# Patient Record
Sex: FEMALE | URBAN - METROPOLITAN AREA
[De-identification: names, ages, dates, MRNs, and addresses within clinical notes are randomized per-mention and may not be internally consistent; named-entity substitution may affect disease eponyms.]

---

## 2019-07-05 ENCOUNTER — INPATIENT (INPATIENT)
Facility: HOSPITAL | Age: 74
LOS: 3 days | Discharge: NURSING, REHAB OR LONG-TERM CARE | End: 2019-07-09
Attending: INTERNAL MEDICINE | Admitting: INTERNAL MEDICINE
Payer: MEDICARE

## 2019-07-05 VITALS
DIASTOLIC BLOOD PRESSURE: 48 MMHG | TEMPERATURE: 98 F | OXYGEN SATURATION: 100 % | SYSTOLIC BLOOD PRESSURE: 120 MMHG | HEART RATE: 90 BPM | RESPIRATION RATE: 16 BRPM

## 2019-07-05 DIAGNOSIS — L03.90 CELLULITIS, UNSPECIFIED: ICD-10-CM

## 2019-07-05 LAB
ALBUMIN SERPL ELPH-MCNC: 3.1 G/DL — LOW (ref 3.3–5)
ALP SERPL-CCNC: 81 U/L — SIGNIFICANT CHANGE UP (ref 40–120)
ALT FLD-CCNC: 18 U/L — SIGNIFICANT CHANGE UP (ref 4–33)
ANION GAP SERPL CALC-SCNC: 11 MMO/L — SIGNIFICANT CHANGE UP (ref 7–14)
APTT BLD: 42.4 SEC — HIGH (ref 27.5–36.3)
AST SERPL-CCNC: 20 U/L — SIGNIFICANT CHANGE UP (ref 4–32)
BASOPHILS # BLD AUTO: 0.04 K/UL — SIGNIFICANT CHANGE UP (ref 0–0.2)
BASOPHILS NFR BLD AUTO: 0.4 % — SIGNIFICANT CHANGE UP (ref 0–2)
BILIRUB SERPL-MCNC: 2 MG/DL — HIGH (ref 0.2–1.2)
BUN SERPL-MCNC: 12 MG/DL — SIGNIFICANT CHANGE UP (ref 7–23)
CALCIUM SERPL-MCNC: 8.7 MG/DL — SIGNIFICANT CHANGE UP (ref 8.4–10.5)
CHLORIDE SERPL-SCNC: 100 MMOL/L — SIGNIFICANT CHANGE UP (ref 98–107)
CO2 SERPL-SCNC: 29 MMOL/L — SIGNIFICANT CHANGE UP (ref 22–31)
CREAT SERPL-MCNC: 0.54 MG/DL — SIGNIFICANT CHANGE UP (ref 0.5–1.3)
EOSINOPHIL # BLD AUTO: 0.12 K/UL — SIGNIFICANT CHANGE UP (ref 0–0.5)
EOSINOPHIL NFR BLD AUTO: 1.2 % — SIGNIFICANT CHANGE UP (ref 0–6)
GLUCOSE SERPL-MCNC: 94 MG/DL — SIGNIFICANT CHANGE UP (ref 70–99)
HCT VFR BLD CALC: 37.6 % — SIGNIFICANT CHANGE UP (ref 34.5–45)
HGB BLD-MCNC: 11.8 G/DL — SIGNIFICANT CHANGE UP (ref 11.5–15.5)
IMM GRANULOCYTES NFR BLD AUTO: 0.3 % — SIGNIFICANT CHANGE UP (ref 0–1.5)
INR BLD: 3.01 — HIGH (ref 0.88–1.17)
LYMPHOCYTES # BLD AUTO: 1.78 K/UL — SIGNIFICANT CHANGE UP (ref 1–3.3)
LYMPHOCYTES # BLD AUTO: 18.1 % — SIGNIFICANT CHANGE UP (ref 13–44)
MAGNESIUM SERPL-MCNC: 1.7 MG/DL — SIGNIFICANT CHANGE UP (ref 1.6–2.6)
MCHC RBC-ENTMCNC: 26.5 PG — LOW (ref 27–34)
MCHC RBC-ENTMCNC: 31.4 % — LOW (ref 32–36)
MCV RBC AUTO: 84.5 FL — SIGNIFICANT CHANGE UP (ref 80–100)
MONOCYTES # BLD AUTO: 1.08 K/UL — HIGH (ref 0–0.9)
MONOCYTES NFR BLD AUTO: 11 % — SIGNIFICANT CHANGE UP (ref 2–14)
NEUTROPHILS # BLD AUTO: 6.76 K/UL — SIGNIFICANT CHANGE UP (ref 1.8–7.4)
NEUTROPHILS NFR BLD AUTO: 69 % — SIGNIFICANT CHANGE UP (ref 43–77)
NRBC # FLD: 0 K/UL — SIGNIFICANT CHANGE UP (ref 0–0)
PHOSPHATE SERPL-MCNC: 2.9 MG/DL — SIGNIFICANT CHANGE UP (ref 2.5–4.5)
PLATELET # BLD AUTO: 158 K/UL — SIGNIFICANT CHANGE UP (ref 150–400)
PMV BLD: 11.8 FL — SIGNIFICANT CHANGE UP (ref 7–13)
POTASSIUM SERPL-MCNC: 3.5 MMOL/L — SIGNIFICANT CHANGE UP (ref 3.5–5.3)
POTASSIUM SERPL-SCNC: 3.5 MMOL/L — SIGNIFICANT CHANGE UP (ref 3.5–5.3)
PROT SERPL-MCNC: 7 G/DL — SIGNIFICANT CHANGE UP (ref 6–8.3)
PROTHROM AB SERPL-ACNC: 34.6 SEC — HIGH (ref 9.8–13.1)
RBC # BLD: 4.45 M/UL — SIGNIFICANT CHANGE UP (ref 3.8–5.2)
RBC # FLD: 14.6 % — HIGH (ref 10.3–14.5)
SODIUM SERPL-SCNC: 140 MMOL/L — SIGNIFICANT CHANGE UP (ref 135–145)
WBC # BLD: 9.81 K/UL — SIGNIFICANT CHANGE UP (ref 3.8–10.5)
WBC # FLD AUTO: 9.81 K/UL — SIGNIFICANT CHANGE UP (ref 3.8–10.5)

## 2019-07-05 PROCEDURE — 93971 EXTREMITY STUDY: CPT | Mod: 26,LT

## 2019-07-05 PROCEDURE — 99223 1ST HOSP IP/OBS HIGH 75: CPT | Mod: AI

## 2019-07-05 RX ORDER — ACETAMINOPHEN 500 MG
975 TABLET ORAL ONCE
Refills: 0 | Status: COMPLETED | OUTPATIENT
Start: 2019-07-05 | End: 2019-07-05

## 2019-07-05 RX ORDER — SODIUM CHLORIDE 9 MG/ML
1000 INJECTION INTRAMUSCULAR; INTRAVENOUS; SUBCUTANEOUS ONCE
Refills: 0 | Status: COMPLETED | OUTPATIENT
Start: 2019-07-05 | End: 2019-07-05

## 2019-07-05 RX ORDER — CEFAZOLIN SODIUM 1 G
1000 VIAL (EA) INJECTION ONCE
Refills: 0 | Status: COMPLETED | OUTPATIENT
Start: 2019-07-05 | End: 2019-07-05

## 2019-07-05 RX ADMIN — Medication 975 MILLIGRAM(S): at 23:33

## 2019-07-05 RX ADMIN — SODIUM CHLORIDE 1000 MILLILITER(S): 9 INJECTION INTRAMUSCULAR; INTRAVENOUS; SUBCUTANEOUS at 23:34

## 2019-07-05 RX ADMIN — Medication 100 MILLIGRAM(S): at 23:34

## 2019-07-05 NOTE — ED PROVIDER NOTE - NS ED MD DISPO SPECIAL CONSIDERATION1
PO- 2 weeks d-[facility]H with diffiucult vaginal extraction. Pt. Notes intermittent pain of 5:10. She is tolerating a full diet, ambulatory and had a hard BM today. She has decreased appetite and denies any N/V    Visit Vitals  BP (P) 120/82   Pulse (P) 83     Gen: A&Ox3, NAD  Abdomen: incisions C/D/I, soft, NT  SVE: deferred    U/a: S.015; trace protein: +2 blood    A/P  Doing well post-operative. Post op instructions reviewed. RTO 4 weeks. None

## 2019-07-05 NOTE — ED PROVIDER NOTE - PHYSICAL EXAMINATION
*Gen: NAD, AAO*3  *HEENT: NC/AT, MMM, airway patent, trachea midline  *CV: RRR, S1/S2 present, no murmurs/rubs/gallops  *Resp: no respiratory distress, LCTAB, no wheezing/rales/rhonchi  *Abd: non-distended, soft N/Tx4, no guarding or rigidity  *Neuro: no focal neuro deficits, moving all limbs appropriately  *Extremities: no gross deformity; LLE/foot swelling 2+ with overlying redness over the dorsal aspect with TTP  *Skin: LLE/foot swelling 2+ with overlying redness over the dorsal aspect with TTP, no wounds   ~ Tawanna Rubi M.D.

## 2019-07-05 NOTE — ED PROVIDER NOTE - CHIEF COMPLAINT
The patient is a 74y Female complaining of The patient is a 74y Female complaining of LLE/foot swelling.

## 2019-07-05 NOTE — ED ADULT NURSE NOTE - INTERVENTIONS DEFINITIONS
Stretcher in lowest position, wheels locked, appropriate side rails in place/Instruct patient to call for assistance/Non-slip footwear when patient is off stretcher/Physically safe environment: no spills, clutter or unnecessary equipment/Provide visual clues: red socks

## 2019-07-05 NOTE — ED PROVIDER NOTE - CLINICAL SUMMARY MEDICAL DECISION MAKING FREE TEXT BOX
Buddy: L foot dorsal swelling, redness, pain. Concern for cellulitis. Eval for DVT by US. Abx. Admit. Buddy: L foot dorsal swelling, redness, pain. Concern for cellulitis. Eval for DVT by US. Abx. Admit.    Elicia MALIK: 74 year-old female with LLE/foot swelling x 2 days with overlying rash on dorsal aspect ~ likely cellulitis.  Plan to rule-out DVT with US; unlikely given she is on AC.

## 2019-07-05 NOTE — ED ADULT TRIAGE NOTE - CHIEF COMPLAINT QUOTE
Pt c/o difficulty ambulating , L foot swelling  and  ecchymosis to R upper arm.  Pt with chronic wound to R hip for 6 yrs.  Pt is on coumadin.  Denies chest pain, sob, dizziness

## 2019-07-05 NOTE — ED PROVIDER NOTE - ATTENDING CONTRIBUTION TO CARE
I performed a face-to-face evaluation of the patient and performed a history and physical examination. I agree with the history and physical examination.    Buddy: L foot dorsal swelling, redness, pain. Concern for cellulitis. Eval for DVT by US. Abx. Admit.

## 2019-07-05 NOTE — ED ADULT NURSE NOTE - OBJECTIVE STATEMENT
rec'd pt in room 4... 20 gauge inserted right ac. bloods sent. sent to [facility] and returned. given meds and ivf as ordered. pt states she was in nursing home for diff walking and lower ext swelling... sent home approx 1 month ago but walking has become more difficult and swelling increased. [facility]  pt came to [facility] hoping to be admitted to rehab.  states  recently had a pacemaker placed. pt has b/l pedal edema but left foot significantly more swollen and red.  right lower ext has multiple scratches and scabs. has multiple bruises throughout the whole body at different stages of healing.    states was on coumadin but told 2 days ago to stop for 2 days. has very dark large bruise to back of left arm and multiple smaller bruises throughout left arm.. minimum 7 bruises.  , back has at least 9 small bruises on left side and 3 on right side.  right arm has 3 larger darker brusies and at least 4 smaller ones on lower part of arm.  right breast has 3 dark bruises on side.  left calf has 2 small bruises. left thigh ha 1 small bruise. has deep non healing ulse to right hip... states has had for 6 years.  denies medical issues but has home o2.  pt aa&ox4 and gave hsitory but does not know her medical issues.  no sp but has slight sob.  placed on o2

## 2019-07-05 NOTE — ED PROVIDER NOTE - OBJECTIVE STATEMENT
74 year-old female with history of HTN, HLD, on Coumadin (patient does not know reason) presents to the Emergency Department for LLE/foot swelling x 2 days with overlying rash on dorsal aspect.  + swelling; difficulty with ambulation.  No fevers, chills, nausea, vomiting, chest pain, shortness of breath, abdominal pain.  Patient took Tylenol at home with relief.  No prior procedures/trauma on lower extremity.

## 2019-07-06 DIAGNOSIS — I82.403 ACUTE EMBOLISM AND THROMBOSIS OF UNSPECIFIED DEEP VEINS OF LOWER EXTREMITY, BILATERAL: ICD-10-CM

## 2019-07-06 DIAGNOSIS — J44.9 CHRONIC OBSTRUCTIVE PULMONARY DISEASE, UNSPECIFIED: ICD-10-CM

## 2019-07-06 DIAGNOSIS — R60.0 LOCALIZED EDEMA: ICD-10-CM

## 2019-07-06 DIAGNOSIS — Z79.899 OTHER LONG TERM (CURRENT) DRUG THERAPY: ICD-10-CM

## 2019-07-06 DIAGNOSIS — Z29.9 ENCOUNTER FOR PROPHYLACTIC MEASURES, UNSPECIFIED: ICD-10-CM

## 2019-07-06 DIAGNOSIS — Z90.49 ACQUIRED ABSENCE OF OTHER SPECIFIED PARTS OF DIGESTIVE TRACT: Chronic | ICD-10-CM

## 2019-07-06 DIAGNOSIS — I10 ESSENTIAL (PRIMARY) HYPERTENSION: ICD-10-CM

## 2019-07-06 DIAGNOSIS — M79.604 PAIN IN RIGHT LEG: ICD-10-CM

## 2019-07-06 DIAGNOSIS — L03.116 CELLULITIS OF LEFT LOWER LIMB: ICD-10-CM

## 2019-07-06 LAB
ALBUMIN SERPL ELPH-MCNC: 2.9 G/DL — LOW (ref 3.3–5)
ALP SERPL-CCNC: 75 U/L — SIGNIFICANT CHANGE UP (ref 40–120)
ALT FLD-CCNC: 16 U/L — SIGNIFICANT CHANGE UP (ref 4–33)
ANION GAP SERPL CALC-SCNC: 13 MMO/L — SIGNIFICANT CHANGE UP (ref 7–14)
AST SERPL-CCNC: 18 U/L — SIGNIFICANT CHANGE UP (ref 4–32)
BASE EXCESS BLDV CALC-SCNC: 4.1 MMOL/L — SIGNIFICANT CHANGE UP
BASOPHILS # BLD AUTO: 0.03 K/UL — SIGNIFICANT CHANGE UP (ref 0–0.2)
BASOPHILS NFR BLD AUTO: 0.3 % — SIGNIFICANT CHANGE UP (ref 0–2)
BILIRUB SERPL-MCNC: 1.6 MG/DL — HIGH (ref 0.2–1.2)
BLOOD GAS VENOUS - CREATININE: 0.51 MG/DL — SIGNIFICANT CHANGE UP (ref 0.5–1.3)
BUN SERPL-MCNC: 13 MG/DL — SIGNIFICANT CHANGE UP (ref 7–23)
CALCIUM SERPL-MCNC: 8.4 MG/DL — SIGNIFICANT CHANGE UP (ref 8.4–10.5)
CHLORIDE BLDV-SCNC: 107 MMOL/L — SIGNIFICANT CHANGE UP (ref 96–108)
CHLORIDE SERPL-SCNC: 104 MMOL/L — SIGNIFICANT CHANGE UP (ref 98–107)
CO2 SERPL-SCNC: 25 MMOL/L — SIGNIFICANT CHANGE UP (ref 22–31)
CREAT SERPL-MCNC: 0.45 MG/DL — LOW (ref 0.5–1.3)
CRP SERPL-MCNC: 138.7 MG/L — HIGH
EOSINOPHIL # BLD AUTO: 0.11 K/UL — SIGNIFICANT CHANGE UP (ref 0–0.5)
EOSINOPHIL NFR BLD AUTO: 1.1 % — SIGNIFICANT CHANGE UP (ref 0–6)
ERYTHROCYTE [SEDIMENTATION RATE] IN BLOOD: 86 MM/HR — HIGH (ref 4–25)
GAS PNL BLDV: 138 MMOL/L — SIGNIFICANT CHANGE UP (ref 136–146)
GLUCOSE BLDV-MCNC: 123 MG/DL — HIGH (ref 70–99)
GLUCOSE SERPL-MCNC: 103 MG/DL — HIGH (ref 70–99)
HCO3 BLDV-SCNC: 28 MMOL/L — HIGH (ref 20–27)
HCT VFR BLD CALC: 36.8 % — SIGNIFICANT CHANGE UP (ref 34.5–45)
HCT VFR BLDV CALC: 36.8 % — SIGNIFICANT CHANGE UP (ref 34.5–45)
HGB BLD-MCNC: 11.6 G/DL — SIGNIFICANT CHANGE UP (ref 11.5–15.5)
HGB BLDV-MCNC: 11.9 G/DL — SIGNIFICANT CHANGE UP (ref 11.5–15.5)
IMM GRANULOCYTES NFR BLD AUTO: 0.4 % — SIGNIFICANT CHANGE UP (ref 0–1.5)
INR BLD: 2.91 — HIGH (ref 0.88–1.17)
LACTATE BLDV-MCNC: 1 MMOL/L — SIGNIFICANT CHANGE UP (ref 0.5–2)
LYMPHOCYTES # BLD AUTO: 1.11 K/UL — SIGNIFICANT CHANGE UP (ref 1–3.3)
LYMPHOCYTES # BLD AUTO: 11.6 % — LOW (ref 13–44)
MAGNESIUM SERPL-MCNC: 1.6 MG/DL — SIGNIFICANT CHANGE UP (ref 1.6–2.6)
MCHC RBC-ENTMCNC: 26.4 PG — LOW (ref 27–34)
MCHC RBC-ENTMCNC: 31.5 % — LOW (ref 32–36)
MCV RBC AUTO: 83.8 FL — SIGNIFICANT CHANGE UP (ref 80–100)
MONOCYTES # BLD AUTO: 0.87 K/UL — SIGNIFICANT CHANGE UP (ref 0–0.9)
MONOCYTES NFR BLD AUTO: 9.1 % — SIGNIFICANT CHANGE UP (ref 2–14)
NEUTROPHILS # BLD AUTO: 7.44 K/UL — HIGH (ref 1.8–7.4)
NEUTROPHILS NFR BLD AUTO: 77.5 % — HIGH (ref 43–77)
NRBC # FLD: 0.04 K/UL — SIGNIFICANT CHANGE UP (ref 0–0)
PCO2 BLDV: 43 MMHG — SIGNIFICANT CHANGE UP (ref 41–51)
PH BLDV: 7.43 PH — SIGNIFICANT CHANGE UP (ref 7.32–7.43)
PHOSPHATE SERPL-MCNC: 2.9 MG/DL — SIGNIFICANT CHANGE UP (ref 2.5–4.5)
PLATELET # BLD AUTO: 149 K/UL — LOW (ref 150–400)
PMV BLD: 11.5 FL — SIGNIFICANT CHANGE UP (ref 7–13)
PO2 BLDV: 79 MMHG — HIGH (ref 35–40)
POTASSIUM BLDV-SCNC: 3.1 MMOL/L — LOW (ref 3.4–4.5)
POTASSIUM SERPL-MCNC: 3.5 MMOL/L — SIGNIFICANT CHANGE UP (ref 3.5–5.3)
POTASSIUM SERPL-SCNC: 3.5 MMOL/L — SIGNIFICANT CHANGE UP (ref 3.5–5.3)
PROT SERPL-MCNC: 6.4 G/DL — SIGNIFICANT CHANGE UP (ref 6–8.3)
PROTHROM AB SERPL-ACNC: 34.3 SEC — HIGH (ref 9.8–13.1)
RBC # BLD: 4.39 M/UL — SIGNIFICANT CHANGE UP (ref 3.8–5.2)
RBC # FLD: 14.7 % — HIGH (ref 10.3–14.5)
SAO2 % BLDV: 96.7 % — HIGH (ref 60–85)
SODIUM SERPL-SCNC: 142 MMOL/L — SIGNIFICANT CHANGE UP (ref 135–145)
SPECIMEN SOURCE: SIGNIFICANT CHANGE UP
SPECIMEN SOURCE: SIGNIFICANT CHANGE UP
WBC # BLD: 9.6 K/UL — SIGNIFICANT CHANGE UP (ref 3.8–10.5)
WBC # FLD AUTO: 9.6 K/UL — SIGNIFICANT CHANGE UP (ref 3.8–10.5)

## 2019-07-06 PROCEDURE — 99232 SBSQ HOSP IP/OBS MODERATE 35: CPT | Mod: GC

## 2019-07-06 RX ORDER — FUROSEMIDE 40 MG
20 TABLET ORAL
Refills: 0 | Status: DISCONTINUED | OUTPATIENT
Start: 2019-07-06 | End: 2019-07-09

## 2019-07-06 RX ORDER — CEPHALEXIN 500 MG
500 CAPSULE ORAL
Refills: 0 | Status: DISCONTINUED | OUTPATIENT
Start: 2019-07-06 | End: 2019-07-09

## 2019-07-06 RX ORDER — IPRATROPIUM/ALBUTEROL SULFATE 18-103MCG
3 AEROSOL WITH ADAPTER (GRAM) INHALATION EVERY 6 HOURS
Refills: 0 | Status: DISCONTINUED | OUTPATIENT
Start: 2019-07-06 | End: 2019-07-09

## 2019-07-06 RX ORDER — BUDESONIDE AND FORMOTEROL FUMARATE DIHYDRATE 160; 4.5 UG/1; UG/1
2 AEROSOL RESPIRATORY (INHALATION)
Refills: 0 | Status: DISCONTINUED | OUTPATIENT
Start: 2019-07-06 | End: 2019-07-09

## 2019-07-06 RX ORDER — LISINOPRIL 2.5 MG/1
0 TABLET ORAL
Qty: 0 | Refills: 0 | DISCHARGE

## 2019-07-06 RX ORDER — TRAMADOL HYDROCHLORIDE 50 MG/1
50 TABLET ORAL EVERY 6 HOURS
Refills: 0 | Status: DISCONTINUED | OUTPATIENT
Start: 2019-07-06 | End: 2019-07-09

## 2019-07-06 RX ORDER — ASPIRIN/CALCIUM CARB/MAGNESIUM 324 MG
1 TABLET ORAL
Qty: 0 | Refills: 0 | DISCHARGE

## 2019-07-06 RX ORDER — WARFARIN SODIUM 2.5 MG/1
1 TABLET ORAL
Qty: 0 | Refills: 0 | DISCHARGE

## 2019-07-06 RX ORDER — TRAMADOL HYDROCHLORIDE 50 MG/1
1 TABLET ORAL
Qty: 0 | Refills: 0 | DISCHARGE

## 2019-07-06 RX ORDER — ASPIRIN/CALCIUM CARB/MAGNESIUM 324 MG
81 TABLET ORAL DAILY
Refills: 0 | Status: DISCONTINUED | OUTPATIENT
Start: 2019-07-06 | End: 2019-07-09

## 2019-07-06 RX ORDER — ACETAMINOPHEN 500 MG
650 TABLET ORAL EVERY 6 HOURS
Refills: 0 | Status: DISCONTINUED | OUTPATIENT
Start: 2019-07-06 | End: 2019-07-09

## 2019-07-06 RX ORDER — ALBUTEROL 90 UG/1
2 AEROSOL, METERED ORAL
Qty: 0 | Refills: 0 | DISCHARGE

## 2019-07-06 RX ORDER — CEPHALEXIN 500 MG
1 CAPSULE ORAL
Qty: 0 | Refills: 0 | DISCHARGE
Start: 2019-07-06 | End: 2019-07-10

## 2019-07-06 RX ADMIN — BUDESONIDE AND FORMOTEROL FUMARATE DIHYDRATE 2 PUFF(S): 160; 4.5 AEROSOL RESPIRATORY (INHALATION) at 12:09

## 2019-07-06 RX ADMIN — Medication 500 MILLIGRAM(S): at 22:13

## 2019-07-06 RX ADMIN — Medication 100 MILLIGRAM(S): at 22:10

## 2019-07-06 RX ADMIN — Medication 20 MILLIGRAM(S): at 17:27

## 2019-07-06 RX ADMIN — Medication 500 MILLIGRAM(S): at 17:27

## 2019-07-06 RX ADMIN — Medication 500 MILLIGRAM(S): at 08:02

## 2019-07-06 RX ADMIN — Medication 100 MILLIGRAM(S): at 14:48

## 2019-07-06 RX ADMIN — Medication 20 MILLIGRAM(S): at 08:02

## 2019-07-06 RX ADMIN — Medication 100 MILLIGRAM(S): at 08:02

## 2019-07-06 RX ADMIN — Medication 500 MILLIGRAM(S): at 12:13

## 2019-07-06 RX ADMIN — BUDESONIDE AND FORMOTEROL FUMARATE DIHYDRATE 2 PUFF(S): 160; 4.5 AEROSOL RESPIRATORY (INHALATION) at 22:10

## 2019-07-06 RX ADMIN — Medication 81 MILLIGRAM(S): at 12:13

## 2019-07-06 RX ADMIN — TRAMADOL HYDROCHLORIDE 50 MILLIGRAM(S): 50 TABLET ORAL at 14:48

## 2019-07-06 RX ADMIN — TRAMADOL HYDROCHLORIDE 50 MILLIGRAM(S): 50 TABLET ORAL at 15:49

## 2019-07-06 NOTE — PHYSICAL THERAPY INITIAL EVALUATION ADULT - ADDITIONAL COMMENTS
Patient reports she lives with her  in a co-op, with 5 steps to enter and 13 to her main living area. Pt reports she was discharged from rehab ~1-2 weeks ago. Since returning home, pt reports she was ambulating with a walker and was independent in ADLs. Pt reports since her most recent fall she has been unable to ambulate.    Patient was left semi-supine in bed as found, all lines/tubes intact and call ramirez within reach, MISA fernandez

## 2019-07-06 NOTE — H&P ADULT - PROBLEM SELECTOR PLAN 2
On intermittent home O2 of 3L NC, usually following exertion. No S/S of exacerbation.   - Duonebs q6hrs PRN  - C/w LABA/ICS with Symbicort (therapeutic interchange for Advair)  - C/w supplemental O2 PRN

## 2019-07-06 NOTE — H&P ADULT - PROBLEM SELECTOR PLAN 4
Per pt, takes lisinopril bid, though dose unknown. Also on PO Lasix for LE edema.  - C/w PO Lasix 20 mg bid  - Will need to determine pt's home dose of lisinopril to continue

## 2019-07-06 NOTE — PROGRESS NOTE ADULT - ATTENDING COMMENTS
Pt was seen and examined. Pt is doing well, vitals stable. + erythema on the dorsum of left foot.   c/w oral abx for nonpurulent mild foot cellulitis, monitor clinical response   needs to clarify pt's coumadin regimen and necessity   duplex no DVT in LLE   PT rec rehab

## 2019-07-06 NOTE — H&P ADULT - PROBLEM SELECTOR PLAN 3
Pt not certain why she is on coumadin 5 mg daily, as she was started on it ~2 years ago by her former PCP, who has now moved [facility]. Per pt, she had b/l LE DVT ~20 years ago, unknown reason.   - INR currently supratherapeutic at 3.01 if for unprovoked DVT. Will need to determine reason pt is on coumadin, especially given recent falls, and c/w daily INR and coumadin dosing if needed. Hold coumadin for now.

## 2019-07-06 NOTE — H&P ADULT - NSHPREVIEWOFSYSTEMS_GEN_ALL_CORE
Constitutional: No fevers, chills, or weight loss  Eyes: No visual changes, double vision, or eye pain  Ears, Nose, Mouth, Throat: No runny nose, sinus pain, ear pain, tinnitus, sore throat, dysphagia, or odynophagia  Cardiovascular: +Chronic b/l LE edema. No chest pain or palpitations.  Respiratory: No cough, wheezing, hemoptysis, or shortness of breath  Gastrointestinal: +Watery diarrhea (resolving). No abdominal pain, nausea/vomiting, constipation, hematemesis, BRBPR, or melena.  Genitourinary: No dysuria, frequency, urgency, or hematuria  Musculoskeletal: +L foot and ankle edema with decreased ROM. No back pain.  Skin: +Erythema on dorsum of L foot. No pruritus or open wounds.   Neurologic: No seizures, headache, paresthesias, or numbness  Psychiatric: No depression, anxiety, or agitation  Endocrine: No heat/cold intolerance, mood swings, sweats, polydipsia, or polyuria  Hematologic/lymphatic: No purpura, petechia, or prolonged or excessive bleeding after dental extraction / injury  Allergic/Immunologic: No anaphylaxis or allergic response to materials, foods, animals    Positives and pertinent negatives noted and all other systems negative.

## 2019-07-06 NOTE — PROGRESS NOTE ADULT - SUBJECTIVE AND OBJECTIVE BOX
MIRLANDE WALLACE  74y, Female    75 yo woman with history of HTN, COPD (on intermittent home O2 of 3L NC), ?unprovoked b/l LE DVT (~20 years ago, on coumadin), chronic b/l LE edema, and b/l knee osteoarthritis with chronic b/l LE pain presents with 5 days of left foot pain, swelling, and redness.  ********************************************  Ivette Finney MD. PGY-1  [phone]/ [id number]    Subjective:    INTERVAL HPI/OVERNIGHT EVENTS: The patient denies acute events overnight. This morning the patient has no complaints, reports that her cellulitis is improving and is less tender. Patient denies fever/chills, N/V/D, SOB, CP,  and abdominal pain      REVIEW OF SYSTEMS: ROS negative except noted in HPI.       FAMILY HISTORY:  No pertinent family history in first degree relatives    Objective:  Vital Signs Last 24 Hrs  T(C): 37 (06 Jul 2019 05:10), Max: 37 (06 Jul 2019 05:10)  T(F): 98.6 (06 Jul 2019 05:10), Max: 98.6 (06 Jul 2019 05:10)  HR: 93 (06 Jul 2019 07:59) (87 - 97)  BP: 138/72 (06 Jul 2019 07:59) (120/48 - 143/72)  BP(mean): --  RR: 21 (06 Jul 2019 05:10) (16 - 28)  SpO2: 95% (06 Jul 2019 01:53) (95% - 100%)    PHYSICAL EXAM:  GENERAL: NAD, well-groomed, well-developed  HEAD:  Atraumatic, Normocephalic  EYES: EOMI, PERRLA, conjunctiva and sclera clear  ENMT: dry mucous membranes with good dentition   NECK: Supple,   NERVOUS SYSTEM:  Alert & Oriented X3, Good concentration  CHEST/LUNG: + crackles at base b/l, anterior lung fields CTA b/l, decreased lung sounds all fields   HEART: Regular rate and rhythm; normal S1, S2. No murmurs, rubs, or gallops  ABDOMEN: Soft, Nontender, Nondistended; Bowel sounds present  EXTREMITIES:  2+ Peripheral Pulses b/l UE. 2+ DP pulse on L, 1+ on R. b/l pitting edema LE. L foot more erythematous than R  LYMPH: No lymphadenopathy noted  SKIN: L foot with 4x4 erythematous area, edematous, no fluctuance noted, no TTP    Consultant(s) Notes Reviewed:  [x ] YES  [ ] NO  Care Discussed with Consultants/Other Providers [ x] YES  [ ] NO    LABS:      RADIOLOGY & ADDITIONAL TESTS:    MEDICATIONS  (STANDING):  aspirin enteric coated 81 milliGRAM(s) Oral daily  buDESOnide 160 MICROgram(s)/formoterol 4.5 MICROgram(s) Inhaler 2 Puff(s) Inhalation two times a day  cephalexin 500 milliGRAM(s) Oral four times a day  furosemide    Tablet 20 milliGRAM(s) Oral two times a day  pregabalin 100 milliGRAM(s) Oral three times a day    MEDICATIONS  (PRN):  acetaminophen   Tablet .. 650 milliGRAM(s) Oral every 6 hours PRN Mild Pain (1 - 3)  ALBUTerol/ipratropium for Nebulization 3 milliLiter(s) Nebulizer every 6 hours PRN Shortness of Breath and/or Wheezing  traMADol 50 milliGRAM(s) Oral every 6 hours PRN Moderate to Severe Pain MIRLANDE WALLACE  74y, Female    73 yo woman with history of HTN, COPD (on intermittent home O2 of 3L NC), unprovoked b/l LE DVT (~20 years ago, on coumadin), chronic b/l LE edema, and b/l knee osteoarthritis with chronic b/l LE pain presents with 5 days of left foot pain, swelling, and redness.  ********************************************  Ivette Finnye MD. PGY-1  [phone]/ [id number]    Subjective:    INTERVAL HPI/OVERNIGHT EVENTS: The patient denies acute events overnight. This morning the patient has no complaints, reports that her cellulitis is improving and is less tender. Patient denies fever/chills, N/V/D, SOB, CP, and abdominal pain. She noted that her chronic foot pain has not gotten worse since admission.     REVIEW OF SYSTEMS: ROS negative except noted above.       FAMILY HISTORY:  No pertinent family history in first degree relatives    Objective:  Vital Signs Last 24 Hrs  T(C): 37 (06 Jul 2019 05:10), Max: 37 (06 Jul 2019 05:10)  T(F): 98.6 (06 Jul 2019 05:10), Max: 98.6 (06 Jul 2019 05:10)  HR: 93 (06 Jul 2019 07:59) (87 - 97)  BP: 138/72 (06 Jul 2019 07:59) (120/48 - 143/72)  BP(mean): --  RR: 21 (06 Jul 2019 05:10) (16 - 28)  SpO2: 95% (06 Jul 2019 01:53) (95% - 100%)    PHYSICAL EXAM:  GENERAL: NAD, well-groomed, well-developed  HEAD:  Atraumatic, Normocephalic  EYES: EOMI, PERRLA, conjunctiva and sclera clear  ENMT: dry mucous membranes with good dentition   NECK: Supple,   NERVOUS SYSTEM:  Alert & Oriented X3, Good concentration  CHEST/LUNG: + crackles at base b/l, anterior lung fields CTA b/l, decreased lung sounds all fields   HEART: Regular rate and rhythm; normal S1, S2. No murmurs, rubs, or gallops  ABDOMEN: Soft, Nontender, Nondistended; Bowel sounds present  EXTREMITIES:  2+ Peripheral Pulses b/l UE. 2+ DP pulse on L, 1+ on R. b/l pitting edema LE. L foot more erythematous than R  LYMPH: No lymphadenopathy noted  SKIN: L foot with 4x4 erythematous area, edematous, no fluctuance noted, no TTP    Consultant(s) Notes Reviewed:  [x ] YES  [ ] NO  Care Discussed with Consultants/Other Providers [ x] YES  [ ] NO    LABS:  CBC Full  -  ( 06 Jul 2019 07:19 )  WBC Count : 9.60 K/uL  RBC Count : 4.39 M/uL  Hemoglobin : 11.6 g/dL  Hematocrit : 36.8 %  Platelet Count - Automated : 149 K/uL  Mean Cell Volume : 83.8 fL  Mean Cell Hemoglobin : 26.4 pg  Mean Cell Hemoglobin Concentration : 31.5 %  Auto Neutrophil # : 7.44 K/uL  Auto Lymphocyte # : 1.11 K/uL  Auto Monocyte # : 0.87 K/uL  Auto Eosinophil # : 0.11 K/uL  Auto Basophil # : 0.03 K/uL  Auto Neutrophil % : 77.5 %  Auto Lymphocyte % : 11.6 %  Auto Monocyte % : 9.1 %  Auto Eosinophil % : 1.1 %  Auto Basophil % : 0.3 %    07-06    142  |  104  |  13  ----------------------------<  103<H>  3.5   |  25  |  0.45<L>    Ca    8.4      06 Jul 2019 07:30  Phos  2.9     07-06  Mg     1.6     07-06    TPro  6.4  /  Alb  2.9<L>  /  TBili  1.6<H>  /  DBili  x   /  AST  18  /  ALT  16  /  AlkPhos  75  07-06    LIVER FUNCTIONS - ( 06 Jul 2019 07:30 )  Alb: 2.9 g/dL / Pro: 6.4 g/dL / ALK PHOS: 75 u/L / ALT: 16 u/L / AST: 18 u/L / GGT: x                 RADIOLOGY & ADDITIONAL TESTS:    MEDICATIONS  (STANDING):  aspirin enteric coated 81 milliGRAM(s) Oral daily  buDESOnide 160 MICROgram(s)/formoterol 4.5 MICROgram(s) Inhaler 2 Puff(s) Inhalation two times a day  cephalexin 500 milliGRAM(s) Oral four times a day  furosemide    Tablet 20 milliGRAM(s) Oral two times a day  pregabalin 100 milliGRAM(s) Oral three times a day    MEDICATIONS  (PRN):  acetaminophen   Tablet .. 650 milliGRAM(s) Oral every 6 hours PRN Mild Pain (1 - 3)  ALBUTerol/ipratropium for Nebulization 3 milliLiter(s) Nebulizer every 6 hours PRN Shortness of Breath and/or Wheezing  traMADol 50 milliGRAM(s) Oral every 6 hours PRN Moderate to Severe Pain

## 2019-07-06 NOTE — H&P ADULT - PROBLEM SELECTOR PLAN 1
Pt likely with mild, nonpurulent left foot cellulitis. No systemic signs of infection, as pt afebrile, and with no leukocytosis. LLE US dopplers negative for DVT.  - C/w abx with PO Keflex 500 mg qid x 5 days  - F/u blood cxs  - F/u ESR and CRP  - Tylenol PRN for mild pain and Tramadol PRN for moderate-severe pain  - PT eval  - Fall risk protocol

## 2019-07-06 NOTE — PROGRESS NOTE ADULT - PROBLEM SELECTOR PLAN 3
- INR currently supratherapeutic at 3.01 if for unprovoked DVT. Will need to determine reason pt is on coumadin, especially given recent falls, and c/w daily INR and coumadin dosing if needed.   - confirm with pharmacy dose patient is taking - INR currently therapeutic at 2.9. if for unprovoked DVT.   - per pharmacy, patient prescribed 4mg coumadin but has not picked it up   - Will need to determine reason pt is on coumadin, especially given recent falls, and c/w daily INR and coumadin dosing if needed.

## 2019-07-06 NOTE — H&P ADULT - PROBLEM SELECTOR PLAN 8
Pt unsure of current home medications.   - Will need to confirm with pt's family or pharmacy pt's current home medications  - Per [facility], pt regularly prescribed diazepam 5 mg, last in mid-June. However, unclear if pt presently taking it, as pt did not mention diazepam as a home medication during interview.

## 2019-07-06 NOTE — DISCHARGE NOTE PROVIDER - NSFOLLOWUPCLINICS_GEN_ALL_ED_FT
[facility] General Internal Medicine  General Internal Medicine  [address] [id number]  Phone: [phone]  Fax:   Follow Up Time:

## 2019-07-06 NOTE — PHYSICAL THERAPY INITIAL EVALUATION ADULT - MANUAL MUSCLE TESTING RESULTS, REHAB EVAL
bilateral UEs & LEs grossly 3+/5 throughout, except bilateral hip flexion 3-/5/grossly assessed due to

## 2019-07-06 NOTE — PROGRESS NOTE ADULT - PROBLEM SELECTOR PLAN 6
Chronic. On Lyrica and Tramadol. [facility] Reference #[id number].  - C/w Lyrica 100 mg tid  - C/w Tramadol 50 mg q6hrs PRN for moderate-severe pain

## 2019-07-06 NOTE — PROGRESS NOTE ADULT - PROBLEM SELECTOR PLAN 1
Pt likely with mild, nonpurulent left foot cellulitis. No systemic signs of infection, as pt afebrile, and with no leukocytosis. LLE US dopplers negative for DVT.  - C/w abx with PO Keflex 500 mg qid x 5 days  - F/u blood cxs  - F/u ESR and CRP  - Tylenol PRN for mild pain and Tramadol PRN for moderate-severe pain  - PT eval  - Fall risk protocol Pt likely with mild, nonpurulent left foot cellulitis. No systemic signs of infection, as pt afebrile, and with no leukocytosis. LLE US dopplers negative for DVT.  - C/w abx with PO Keflex 500 mg qid x 5 days  - F/u blood cxs  - Tylenol PRN for mild pain and Tramadol PRN for moderate-severe pain  - PT eval  - Fall risk protocol

## 2019-07-06 NOTE — H&P ADULT - NSICDXPASTMEDICALHX_GEN_ALL_CORE_FT
PAST MEDICAL HISTORY:  Chronic obstructive pulmonary disease (COPD)     DVT, lower extremity     Edema, lower extremity     HTN (hypertension)     Osteoarthritis of knee

## 2019-07-06 NOTE — DISCHARGE NOTE PROVIDER - NSDCFUADDINST_GEN_ALL_CORE_FT
Please check INR daily until therapeutic range of 2-3 as leaving hospital with subtherapeutic INR.   Please discuss with PMD as outpatient regarding testing for peripheral vascular disease with TASHA/PVR.

## 2019-07-06 NOTE — PROGRESS NOTE ADULT - PROBLEM SELECTOR PLAN 4
Per pt, takes lisinopril bid, though dose unknown. Also on PO Lasix for LE edema.  - C/w PO Lasix 20 mg bid  - Will need to determine pt's home dose of lisinopril to continue Per pt, takes lisinopril bid, though dose unknown. Also on PO Lasix for LE edema.  - C/w PO Lasix 20 mg bid  - Will need to determine pt's home dose of lisinopril to continue. Pharmacy has no record of lisinopril in recent medication renewals

## 2019-07-06 NOTE — DISCHARGE NOTE PROVIDER - HOSPITAL COURSE
75 yo woman with history of HTN, COPD (3L NC portable oxygen, intermittent use), ?unprovoked b/l LE DVT (~20 years ago, on coumadin), chronic b/l LE edema, and b/l knee osteoarthritis with chronic b/l LE pain presents with 5 days of left foot pain, swelling, and redness after a fall (no head trauma or LOC). The patient was evaluated at [facility] for fall and was discharged once imaging came back negative. After this the pain, swelling and redness did not resolve and the patient was beginning to have difficulty ambulating (baseline uses cane or rolling walker).  She applied aspercreme and it was beginning to improve but the patient came to the ED for further treatment and rehab referral. In the ED, patient was afebrile, VSS, dopplers were negative for DVT, and labs revealed no leukocytosis, BCx pending. She received a dose of IV cefazolin for LLE cellulitis. The patient was admitted and switched over to PO cephalexin and the cellulitis has improved. Since admission, patient has remained afebrile with no leukocytosis, PT evaluation pending. 73 yo woman with history of HTN, COPD (3L NC portable oxygen, intermittent use), ?unprovoked b/l LE DVT (~20 years ago, on coumadin), chronic b/l LE edema, and b/l knee osteoarthritis with chronic b/l LE pain presents with 5 days of left foot pain, swelling, and redness after a fall (no head trauma or LOC). The patient was evaluated at [facility] for fall and was discharged once imaging came back negative. After this the pain, swelling and redness did not resolve and the patient was beginning to have difficulty ambulating (baseline uses cane or rolling walker).  She applied aspercreme and it was beginning to improve but the patient came to the ED for further treatment and rehab referral. In the ED, patient was afebrile, VSS, LLE venous doppler was negative for DVT, and labs revealed no leukocytosis, BCx pending. She received a dose of IV cefazolin for LLE cellulitis. The patient was admitted and switched over to PO cephalexin for 5 days. On HOD 2 the patient was found to have an INR of 3 and her coumadin was held until it was determined why she was prescribed this medication, she remained afebrile with no leukocytosis and reported less foot pain. The following day there were no changes in her course, her coumadin was held until verification from the PMD. On HOD 4, the patient's INR was down to 1.3 and she was dosed with 4mg coumadin (home dose) and the PCP informed the team that the patient has a history of atrial fibrillation. The patient reported new foot pain on the ventral aspect of her L foot which was investigated with a x-ray that showed soft tissue changes consistent with cellulitis with no acute fractures or evidence of osteomyelitis. The next hospital day, the patient is able to put more weight on her foot as the pain has improved. Her cellulitis appears stable and her chronic b/l LE edema is unchanged. Her vital signs were stable, afebrile and labs show no leukocytosis. 75 yo woman with history of HTN, COPD (3L NC portable oxygen, intermittent use), ?unprovoked b/l LE DVT (~20 years ago, on coumadin), chronic b/l LE edema, and b/l knee osteoarthritis with chronic b/l LE pain presents with 5 days of left foot pain, swelling, and redness after a fall (no head trauma or LOC). The patient was evaluated at [facility] for fall and was discharged once imaging came back negative. After this the pain, swelling and redness did not resolve and the patient was beginning to have difficulty ambulating (baseline uses cane or rolling walker).  She applied aspercreme and it was beginning to improve but the patient came to the ED for further treatment and rehab referral. In the ED, patient was afebrile, VSS, LLE venous doppler was negative for DVT, and labs revealed no leukocytosis, BCx pending. She received a dose of IV cefazolin for LLE cellulitis. The patient was admitted and switched over to PO cephalexin for 5 days. On HOD 2 the patient was found to have an INR of 3 and her coumadin was held until it was determined why she was prescribed this medication, she remained afebrile with no leukocytosis and reported less foot pain. The following day there were no changes in her course, her coumadin was held until verification from the PMD. On HOD 4, the patient's INR was down to 1.3 and she was dosed with 4mg coumadin (home dose) and the PCP informed the team that the patient has a history of atrial fibrillation. The patient reported new foot pain on the ventral aspect of her L foot which was investigated with a x-ray that showed soft tissue changes consistent with cellulitis with no acute fractures or evidence of osteomyelitis. The next hospital day, the patient is able to put more weight on her foot as the pain has improved. Her cellulitis appears stable and her chronic b/l LE edema is unchanged. Her vital signs were stable, afebrile and labs show no leukocytosis. The patient does not endorse any new symptoms and has been stable since admission, can finish her cellulitis treatment in an inpatient rehab facility for discharge. 75 yo woman with history of HTN, COPD (3L NC portable oxygen, intermittent use), ?unprovoked b/l LE DVT (~20 years ago, on coumadin), chronic b/l LE edema, and b/l knee osteoarthritis with chronic b/l LE pain presents with 5 days of left foot pain, swelling, and redness after a fall (no head trauma or LOC). The patient was evaluated at [facility] for fall and was discharged once imaging came back negative. After this the pain, swelling and redness did not resolve and the patient was beginning to have difficulty ambulating (baseline uses cane or rolling walker).  She applied aspercreme and it was beginning to improve but the patient came to the ED for further treatment and rehab referral. In the ED, patient was afebrile, VSS, LLE venous doppler was negative for DVT, and labs revealed no leukocytosis, BCx drawn in the ED  were positive for gram positive cocci in clusters, coagulase negative staph epidermidis. She received a dose of IV cefazolin for LLE cellulitis. The patient was admitted and switched over to PO cephalexin for 5 days. On HOD 2 the patient was found to have an INR of 3 and her coumadin was held until it was determined why she was prescribed this medication, she remained afebrile with no leukocytosis and reported less foot pain. The following day there were no changes in her course, her coumadin continued to be held. On HOD 4, the patient's INR was down to 1.3 and she was dosed with 4mg coumadin (home dose) and the PCP informed the team that the patient has a history of atrial fibrillation as the reason for her anticoagulation, CHADS-VASC of 4. The patient reported new foot pain on the ventral aspect of her L foot which was investigated with a x-ray that showed soft tissue changes consistent with cellulitis with no acute fractures or evidence of osteomyelitis. The next hospital day, the patient was able to put more weight on her foot as the pain has improved. Her cellulitis appears stable and her chronic b/l LE edema is unchanged and to be followed up with a primary care provider for peripheral artery disease. Repeat blood cultures drawn on 07/07 (HOD 3) have no growth to date. Throughout her admission, her vital signs were stable, she was febrile and labs show no leukocytosis. The patient does not endorse any new symptoms and has been stable since admission, can finish her cellulitis treatment in an inpatient rehab facility for discharge.

## 2019-07-06 NOTE — H&P ADULT - ASSESSMENT
75 yo woman with history of HTN, COPD (on intermittent home O2 of 3L NC), ?unprovoked b/l LE DVT (~20 years ago, on coumadin), chronic b/l LE edema (on Lasix), and b/l knee osteoarthritis with chronic b/l LE pain presents with 5 days of left foot pain, swelling, and redness, admitted with left foot nonpurulent cellulitis and difficulty with ambulation. 75 yo woman with history of HTN, COPD (on intermittent home O2 of 3L NC), ?unprovoked b/l LE DVT (~20 years ago, on coumadin), chronic b/l LE edema (on Lasix), and b/l knee osteoarthritis with chronic b/l LE pain presents with 5 days of left foot pain, swelling, and redness, admitted with nonpurulent left foot cellulitis and difficulty with ambulation.

## 2019-07-06 NOTE — H&P ADULT - PROBLEM SELECTOR PLAN 7
- DVT ppx: On coumadin at home. Will hold coumadin for now until indication for anticoagulation determined.  - Diet: DASH/TLC  - Dispo: PT eval pending

## 2019-07-06 NOTE — CHART NOTE - NSCHARTNOTEFT_GEN_A_CORE
Spoke to pharmacist at [address] [facility], [facility]. The patient noted this has her current pharmacy. Per Pharmacist patient picked up these medications on 6/30:    - Symbicort 160mg-4.5mcgs 2 puffs twice a day  - Albuterol 2 puffs twice a day  - furosemide 40mg once daily  - singulair 10mg qHS  - Cipro 500mg bid for 5 days (prescribed 6/25)       Ivette Finney MD. PGY-1  [phone]

## 2019-07-06 NOTE — DISCHARGE NOTE PROVIDER - NSDCCPCAREPLAN_GEN_ALL_CORE_FT
PRINCIPAL DISCHARGE DIAGNOSIS  Diagnosis: Cellulitis  Assessment and Plan of Treatment: You had cellulitis of your foot that was being treated with antibiotics. Please continue to take your antibiotics and complete your rehab to return to your baseline functioning. You should finish your antibiotics on the 10th of July. PRINCIPAL DISCHARGE DIAGNOSIS  Diagnosis: Cellulitis  Assessment and Plan of Treatment: You had cellulitis of your foot that was being treated with antibiotics. Please continue to take your antibiotics and complete your rehab to return to your baseline functioning. You should finish your antibiotics on the 10th of July. Please return to the ED if the swelling in your leg starts to worsen or you develop worsening ability to walk. PRINCIPAL DISCHARGE DIAGNOSIS  Diagnosis: Cellulitis  Assessment and Plan of Treatment: You had cellulitis of your foot that was being treated with antibiotics. Please continue to take your antibiotics and complete your rehab to return to your baseline functioning. You should finish your antibiotics on the 12th of July. Please return to the ED if the swelling in your leg starts to worsen or you develop worsening ability to walk.      SECONDARY DISCHARGE DIAGNOSES  Diagnosis: Atrial fibrillation, unspecified type  Assessment and Plan of Treatment: You were on anticoagulation with intial unclear reason, but found to have history of atrial fibrillation by PMD.  CHADVASC-4, do not need to bridge patient with full dose anticoagulation. Please dose patient's coumadin at rehab and check INR daily until therapeutic range of 2-3.  Therafter check INR as per your institution's protocol..

## 2019-07-06 NOTE — PHYSICAL THERAPY INITIAL EVALUATION ADULT - PATIENT PROFILE REVIEW, REHAB EVAL
PT orders received: out of bed with assistance. Consult with RN Meredith MERCADO, pt may participate in PT evaluation./yes

## 2019-07-06 NOTE — PHYSICAL THERAPY INITIAL EVALUATION ADULT - PERTINENT HX OF CURRENT PROBLEM, REHAB EVAL
Patient is a 74 year old female admitted to [facility] on 7/5 with left foot pain, swelling, and redness, also s/p fall at home. PMH: HTN, COPD (on intermittent home O2 of 3L NC), ?unprovoked b/l LE DVT (~20 years ago, on coumadin), chronic b/l LE edema (on Lasix), and b/l knee osteoarthritis with chronic b/l LE pain.

## 2019-07-06 NOTE — H&P ADULT - HISTORY OF PRESENT ILLNESS
73 yo woman with history of HTN, COPD (on intermittent home O2 of 3L NC), ?unprovoked b/l LE DVT (~20 years ago, on coumadin), and b/l knee osteoarthritis with chronic b/l LE pain presents with 5 days of left foot pain, redness, and swelling. Pt states that [facility] after a 38-day stay there following admission at [facility] for a fall. Shortly after returning home from [facility], pt sustained another fall, as her rolling walker slipped when pt attempted to grab it while in her bathroom. Pt did not sustain any head trauma or loss of consciousness but had left foot pain and swelling immediately after the fall. Pt was evaluated in the ED at [facility] and discharged home after all imaging came back negative. Pt, however, continued to have left foot pain and swelling, for which she started applying Aspercreme. A couple days later, pt noticed redness that spread throughout the top of her left foot, causing increased pain and difficulty with ambulation. Denies any F/C or other trauma/injuries to the foot. No CP, SOB, palpitations, abdominal pain, N/V, constipation, or urinary symptoms. 75 yo woman with history of HTN, COPD (on intermittent home O2 of 3L NC), ?unprovoked b/l LE DVT (~20 years ago, on coumadin), chronic b/l LE edema (on Lasix), and b/l knee osteoarthritis with chronic b/l LE pain presents with 5 days of left foot pain, swelling, and redness. Pt states that [facility] after a 38-day stay there following admission at [facility] for a fall. Shortly after returning home from [facility], pt sustained another fall, as her rolling walker slipped when pt attempted to grab it while in her bathroom. Pt did not sustain any head trauma or loss of consciousness but had left foot pain and swelling immediately after the fall. Pt was evaluated in the ED at [facility] and discharged home after all imaging came back negative. Pt, however, continued to have left foot pain and swelling, for which she started applying Aspercreme. A couple days later, pt noticed redness that spread throughout the top of her left foot, causing severe pain and difficulty with ambulation. Denies any F/C or other trauma/injuries to the foot. No CP, SOB, palpitations, abdominal pain, N/V, constipation, or urinary symptoms. Pt had 2 days of watery diarrhea earlier in the week that is now resolving. Pt reports that the left foot pain, swelling, and redness were gradually improving without any treatment, but pt decided to come to the ED on Friday to see if she could return to rehab to work on her ambulation. At baseline, pt relies on either a cane or rolling walker to ambulate.    In the ED,  T 98.1, HR 90, /48, RR 16, and O2 sats 100% RA. LLE US dopplers negative for DVT. Got dose of IV cefazolin for possible LLE cellulitis.

## 2019-07-06 NOTE — H&P ADULT - NSHPLABSRESULTS_GEN_ALL_CORE
EKG personally reviewed.  Sinus rhythm at 100 bpm, no acute ischemic changes, QTc 464 ms.     Imaging personally reviewed.  US Duplex Venous Lower Ext Ltd, Left (07.05.19 @ 22:23)     FINDINGS:    There is normal compressibility of the left common femoral, femoral and   popliteal veins.     The contralateral common femoral vein is patent.    Doppler examination shows normal spontaneous and phasic flow.    No calf vein thrombosis is detected.    IMPRESSION:     No evidence of left lower extremity deep venous thrombosis.

## 2019-07-06 NOTE — H&P ADULT - PROBLEM SELECTOR PLAN 6
Chronic. On Lyrica and Tramadol. Istop Reference #[id number].  - C/w Lyrica 100 mg tid  - C/w Tramadol 50 mg q6hrs PRN for moderate-severe pain

## 2019-07-06 NOTE — PROGRESS NOTE ADULT - PROBLEM SELECTOR PLAN 8
Pt unsure of current home medications.   - Will need to confirm with pt's family or pharmacy pt's current home medications  - Per Istop, pt regularly prescribed diazepam 5 mg, last in mid-June. However, unclear if pt presently taking it, as pt did not mention diazepam as a home medication during interview. - Verifed with pharmacy home medications  - Per Istop, pt regularly prescribed diazepam 5 mg, last in mid-June. However, unclear if pt presently taking it, as pt did not mention diazepam as a home medication during interview.

## 2019-07-06 NOTE — DISCHARGE NOTE PROVIDER - NSDCACTIVITY_GEN_ALL_CORE
Walking - Indoors allowed/Do not drive or operate machinery/Showering allowed/Stairs allowed/Bathing allowed

## 2019-07-06 NOTE — PHYSICAL THERAPY INITIAL EVALUATION ADULT - LEVEL OF INDEPENDENCE: GAIT, REHAB EVAL
unable to perform/bilateral LE weakness and knee buckling. Pt with increased anxiety and fear of falling

## 2019-07-06 NOTE — H&P ADULT - NSHPSOCIALHISTORY_GEN_ALL_CORE
Tobacco: former smoker; smoked 1 ppd from 16 to ~60 years old  EtOH: denies  Illicit drugs: denies  Lives with . At baseline, ambulates with a cane or rolling walker.

## 2019-07-06 NOTE — DISCHARGE NOTE PROVIDER - CARE PROVIDER_API CALL
Claire Riggs)  Internal Medicine  [address] [place] [address]  Phone: [phone]  Fax: [phone]  Follow Up Time:

## 2019-07-06 NOTE — PROGRESS NOTE ADULT - PROBLEM SELECTOR PLAN 7
- DVT ppx: On coumadin at home. Will hold coumadin for now until indication for anticoagulation determined.  - Diet: DASH/TLC  - Dispo: PT eval pending - DVT ppx: On coumadin at home. Will hold coumadin for now until indication for anticoagulation determined and patient current abx regimen can raise INR   - Diet: DASH/TLC  - Dispo: PT eval pending

## 2019-07-06 NOTE — H&P ADULT - NSHPPHYSICALEXAM_GEN_ALL_CORE
Vital Signs Last 24 Hrs  T(C): 37 (06 Jul 2019 05:10), Max: 37 (06 Jul 2019 05:10)  T(F): 98.6 (06 Jul 2019 05:10), Max: 98.6 (06 Jul 2019 05:10)  HR: 97 (06 Jul 2019 05:10) (87 - 97)  BP: 143/72 (06 Jul 2019 05:10) (120/48 - 143/72)  BP(mean): --  RR: 21 (06 Jul 2019 05:10) (16 - 28)  SpO2: 95% (06 Jul 2019 01:53) (95% - 100%)    PHYSICAL EXAM:  General: Obese.  Awake and alert.  No acute distress.  Head: Normocephalic, atraumatic.    Eyes: PERRL.  EOMI.  No scleral icterus.   Mouth: Moist MM.  No oropharyngeal exudates.    Neck: Supple.  Full range of motion.  No JVD.  Trachea midline.  No lymphadenopathy.   Heart: RRR.  Normal S1 and S2.  No murmurs, rubs, or gallops.  1+ pitting LE edema b/l (L slightly worse than R).   Lungs: Good inspiratory effort.  Nonlabored breathing.  CTAB but with diminished breath sounds in all lung fields.  No wheezes, crackles, or rhonchi.    Abdomen: BS+, soft, NT/ND.    Skin: L foot edema with erythematous, slightly warm-to-touch patch on dorsum of foot, TTP.   Extremities: No clubbing or cyanosis.  2+ radial pulses b/l and R DP pulse.  1+ L DP pulse.   Musculoskeletal: No joint deformities.  No spinal or paraspinal tenderness.  Neuro: A&Ox3.  CN II-XII intact.  5/5 motor strength in UE and LE b/l.  Tactile sensation intact in UE and LE b/l.  No focal deficits. Vital Signs Last 24 Hrs  T(C): 37 (06 Jul 2019 05:10), Max: 37 (06 Jul 2019 05:10)  T(F): 98.6 (06 Jul 2019 05:10), Max: 98.6 (06 Jul 2019 05:10)  HR: 97 (06 Jul 2019 05:10) (87 - 97)  BP: 143/72 (06 Jul 2019 05:10) (120/48 - 143/72)  BP(mean): --  RR: 21 (06 Jul 2019 05:10) (16 - 28)  SpO2: 95% (06 Jul 2019 01:53) (95% - 100%)    PHYSICAL EXAM:  General: Obese.  Awake and alert.  No acute distress.  Head: Normocephalic, atraumatic.    Eyes: PERRL.  EOMI.  No scleral icterus.   Mouth: Moist MM.  No oropharyngeal exudates.    Neck: Supple.  Full range of motion.  No JVD.  Trachea midline.  No lymphadenopathy.   Heart: RRR.  Normal S1 and S2.  No murmurs, rubs, or gallops.  1+ pitting LE edema b/l (L slightly worse than R).   Lungs: Good inspiratory effort.  Nonlabored breathing.  CTAB but with diminished breath sounds in all lung fields.  No wheezes, crackles, or rhonchi.    Abdomen: BS+, soft, NT/ND.    Skin: L foot edema with erythematous, slightly warm-to-touch patch on dorsum of foot with TTP, no fluctuance.   Extremities: No clubbing or cyanosis.  2+ radial pulses b/l and R DP pulse.  1+ L DP pulse.   Musculoskeletal: No joint deformities.  No spinal or paraspinal tenderness.  Neuro: A&Ox3.  CN II-XII intact.  5/5 motor strength in UE and LE b/l.  Tactile sensation intact in UE and LE b/l.  No focal deficits.

## 2019-07-07 LAB
APTT BLD: 35.5 SEC — SIGNIFICANT CHANGE UP (ref 27.5–36.3)
GLUCOSE BLDC GLUCOMTR-MCNC: 89 MG/DL — SIGNIFICANT CHANGE UP (ref 70–99)
HCV AB S/CO SERPL IA: 0.11 S/CO — SIGNIFICANT CHANGE UP (ref 0–0.99)
HCV AB SERPL-IMP: SIGNIFICANT CHANGE UP
INR BLD: 1.78 — HIGH (ref 0.88–1.17)
METHOD TYPE: SIGNIFICANT CHANGE UP
ORGANISM # SPEC MICROSCOPIC CNT: SIGNIFICANT CHANGE UP
ORGANISM # SPEC MICROSCOPIC CNT: SIGNIFICANT CHANGE UP
PROTHROM AB SERPL-ACNC: 20.6 SEC — HIGH (ref 9.8–13.1)

## 2019-07-07 PROCEDURE — 99232 SBSQ HOSP IP/OBS MODERATE 35: CPT | Mod: GC

## 2019-07-07 RX ADMIN — Medication 20 MILLIGRAM(S): at 17:36

## 2019-07-07 RX ADMIN — Medication 500 MILLIGRAM(S): at 17:36

## 2019-07-07 RX ADMIN — Medication 500 MILLIGRAM(S): at 06:34

## 2019-07-07 RX ADMIN — Medication 100 MILLIGRAM(S): at 06:34

## 2019-07-07 RX ADMIN — BUDESONIDE AND FORMOTEROL FUMARATE DIHYDRATE 2 PUFF(S): 160; 4.5 AEROSOL RESPIRATORY (INHALATION) at 21:50

## 2019-07-07 RX ADMIN — Medication 500 MILLIGRAM(S): at 12:41

## 2019-07-07 RX ADMIN — BUDESONIDE AND FORMOTEROL FUMARATE DIHYDRATE 2 PUFF(S): 160; 4.5 AEROSOL RESPIRATORY (INHALATION) at 09:01

## 2019-07-07 RX ADMIN — TRAMADOL HYDROCHLORIDE 50 MILLIGRAM(S): 50 TABLET ORAL at 18:30

## 2019-07-07 RX ADMIN — Medication 81 MILLIGRAM(S): at 12:41

## 2019-07-07 RX ADMIN — TRAMADOL HYDROCHLORIDE 50 MILLIGRAM(S): 50 TABLET ORAL at 09:00

## 2019-07-07 RX ADMIN — TRAMADOL HYDROCHLORIDE 50 MILLIGRAM(S): 50 TABLET ORAL at 17:44

## 2019-07-07 RX ADMIN — Medication 100 MILLIGRAM(S): at 12:41

## 2019-07-07 RX ADMIN — Medication 500 MILLIGRAM(S): at 23:04

## 2019-07-07 RX ADMIN — Medication 20 MILLIGRAM(S): at 06:34

## 2019-07-07 RX ADMIN — TRAMADOL HYDROCHLORIDE 50 MILLIGRAM(S): 50 TABLET ORAL at 09:45

## 2019-07-07 RX ADMIN — Medication 100 MILLIGRAM(S): at 21:50

## 2019-07-07 NOTE — PROGRESS NOTE ADULT - PROBLEM SELECTOR PLAN 4
Per pt, takes lisinopril bid, though dose unknown. Also on PO Lasix for LE edema.  - C/w PO Lasix 20 mg bid  - Will need to determine pt's home dose of lisinopril to continue. Pharmacy has no record of lisinopril in recent medication renewals Per pt, takes lisinopril bid, though dose unknown. Also on PO Lasix for LE edema.  - C/w PO Lasix 20 mg bid  - Will need to determine pt's home dose of lisinopril to continue. Pharmacy has no record of lisinopril in recent medication renewals, will clarify with PCP on Monday

## 2019-07-07 NOTE — PROGRESS NOTE ADULT - ATTENDING COMMENTS
Pt was seen and examined. Pt is doing well, vitals stable. the skin change on the dorsum of left foot is more likely due to abrasion rather than cellulitic change to me.   + Coag neg staph in blood cx after 26 hours. likely contamination. repeat blood cx  Remote history of LLE DVT per pt. She is unsure why she still needs to be on coumadin. She reported she was off coumadin for a while, then put back on by her PCP for unclear reason. Needs to reach PCP to clarify   duplex no DVT in LLE   PT rec rehab

## 2019-07-07 NOTE — PROGRESS NOTE ADULT - SUBJECTIVE AND OBJECTIVE BOX
Patient is a 74y old  Female who presents with a chief complaint of L foot pain, swelling, and redness (06 Jul 2019 14:16)      SUBJECTIVE / OVERNIGHT EVENTS:  Patient seen and examined at bedside. No acute events overnight.     MEDICATIONS  (STANDING):  aspirin enteric coated 81 milliGRAM(s) Oral daily  buDESOnide 160 MICROgram(s)/formoterol 4.5 MICROgram(s) Inhaler 2 Puff(s) Inhalation two times a day  cephalexin 500 milliGRAM(s) Oral four times a day  furosemide    Tablet 20 milliGRAM(s) Oral two times a day  pregabalin 100 milliGRAM(s) Oral three times a day    MEDICATIONS  (PRN):  acetaminophen   Tablet .. 650 milliGRAM(s) Oral every 6 hours PRN Mild Pain (1 - 3)  ALBUTerol/ipratropium for Nebulization 3 milliLiter(s) Nebulizer every 6 hours PRN Shortness of Breath and/or Wheezing  traMADol 50 milliGRAM(s) Oral every 6 hours PRN Moderate to Severe Pain      Vital Signs Last 24 Hrs  T(C): 36.7 (07 Jul 2019 05:24), Max: 37.4 (06 Jul 2019 15:03)  T(F): 98 (07 Jul 2019 05:24), Max: 99.3 (06 Jul 2019 15:03)  HR: 84 (07 Jul 2019 05:24) (84 - 101)  BP: 139/73 (07 Jul 2019 05:24) (112/48 - 139/73)  BP(mean): --  RR: 18 (07 Jul 2019 05:24) (15 - 18)  SpO2: 99% (07 Jul 2019 05:24) (82% - 99%)  CAPILLARY BLOOD GLUCOSE        I&O's Summary      PHYSICAL EXAM:  GENERAL: NAD, well-developed  HEAD:  Atraumatic, Normocephalic  EYES: EOMI,  conjunctiva and sclera clear  NECK: Supple  CHEST/LUNG: Clear to auscultation bilaterally; No wheeze  HEART: Regular rate and rhythm; No murmurs, rubs, or gallops  ABDOMEN: Soft, Nontender, Nondistended; Bowel sounds present  EXTREMITIES:  No clubbing, cyanosis, or edema  PSYCH: AAOx3  NEUROLOGY: non-focal  SKIN: No rashes or lesions    LABS:                        11.6   9.60  )-----------( 149      ( 06 Jul 2019 07:19 )             36.8     WBC Trend: 9.60<--, 9.81<--  07-06    142  |  104  |  13  ----------------------------<  103<H>  3.5   |  25  |  0.45<L>    Ca    8.4      06 Jul 2019 07:30  Phos  2.9     07-06  Mg     1.6     07-06    TPro  6.4  /  Alb  2.9<L>  /  TBili  1.6<H>  /  DBili  x   /  AST  18  /  ALT  16  /  AlkPhos  75  07-06    Creatinine Trend: 0.45<--, 0.54<--  PT/INR - ( 06 Jul 2019 07:19 )   PT: 34.3 SEC;   INR: 2.91          PTT - ( 05 Jul 2019 21:56 )  PTT:42.4 SEC            RADIOLOGY & ADDITIONAL TESTS:    Imaging Personally Reviewed:    Consultant(s) Notes Reviewed:      Care Discussed with Consultants/Other Providers: Patient is a 74y old  Female who presents with a chief complaint of L foot pain, swelling, and redness (06 Jul 2019 14:16)      SUBJECTIVE / OVERNIGHT EVENTS:  Patient seen and examined at bedside. No acute events overnight. This Am patient endorses feeling okay. States that her L foot continues to hurt. Denuies    MEDICATIONS  (STANDING):  aspirin enteric coated 81 milliGRAM(s) Oral daily  buDESOnide 160 MICROgram(s)/formoterol 4.5 MICROgram(s) Inhaler 2 Puff(s) Inhalation two times a day  cephalexin 500 milliGRAM(s) Oral four times a day  furosemide    Tablet 20 milliGRAM(s) Oral two times a day  pregabalin 100 milliGRAM(s) Oral three times a day    MEDICATIONS  (PRN):  acetaminophen   Tablet .. 650 milliGRAM(s) Oral every 6 hours PRN Mild Pain (1 - 3)  ALBUTerol/ipratropium for Nebulization 3 milliLiter(s) Nebulizer every 6 hours PRN Shortness of Breath and/or Wheezing  traMADol 50 milliGRAM(s) Oral every 6 hours PRN Moderate to Severe Pain      Vital Signs Last 24 Hrs  T(C): 36.7 (07 Jul 2019 05:24), Max: 37.4 (06 Jul 2019 15:03)  T(F): 98 (07 Jul 2019 05:24), Max: 99.3 (06 Jul 2019 15:03)  HR: 84 (07 Jul 2019 05:24) (84 - 101)  BP: 139/73 (07 Jul 2019 05:24) (112/48 - 139/73)  BP(mean): --  RR: 18 (07 Jul 2019 05:24) (15 - 18)  SpO2: 99% (07 Jul 2019 05:24) (82% - 99%)  CAPILLARY BLOOD GLUCOSE        I&O's Summary      PHYSICAL EXAM:  GENERAL: NAD, well-developed  HEAD:  Atraumatic, Normocephalic  EYES: EOMI,  conjunctiva and sclera clear  NECK: Supple  CHEST/LUNG: Clear to auscultation bilaterally; No wheeze  HEART: Regular rate and rhythm; No murmurs, rubs, or gallops  ABDOMEN: Soft, Nontender, Nondistended; Bowel sounds present  EXTREMITIES:  No clubbing, cyanosis, or edema  PSYCH: AAOx3  NEUROLOGY: non-focal  SKIN: No rashes or lesions    LABS:                        11.6   9.60  )-----------( 149      ( 06 Jul 2019 07:19 )             36.8     WBC Trend: 9.60<--, 9.81<--  07-06    142  |  104  |  13  ----------------------------<  103<H>  3.5   |  25  |  0.45<L>    Ca    8.4      06 Jul 2019 07:30  Phos  2.9     07-06  Mg     1.6     07-06    TPro  6.4  /  Alb  2.9<L>  /  TBili  1.6<H>  /  DBili  x   /  AST  18  /  ALT  16  /  AlkPhos  75  07-06    Creatinine Trend: 0.45<--, 0.54<--  PT/INR - ( 06 Jul 2019 07:19 )   PT: 34.3 SEC;   INR: 2.91          PTT - ( 05 Jul 2019 21:56 )  PTT:42.4 SEC            RADIOLOGY & ADDITIONAL TESTS:    Imaging Personally Reviewed:    Consultant(s) Notes Reviewed:      Care Discussed with Consultants/Other Providers: Patient is a 74y old  Female who presents with a chief complaint of L foot pain, swelling, and redness (06 Jul 2019 14:16)      SUBJECTIVE / OVERNIGHT EVENTS:  Patient seen and examined at bedside. No acute events overnight. This Am patient endorses feeling okay. States that her L foot continues to hurt. Denies any N/V/D/CP/SOB/Palpitations.     MEDICATIONS  (STANDING):  aspirin enteric coated 81 milliGRAM(s) Oral daily  buDESOnide 160 MICROgram(s)/formoterol 4.5 MICROgram(s) Inhaler 2 Puff(s) Inhalation two times a day  cephalexin 500 milliGRAM(s) Oral four times a day  furosemide    Tablet 20 milliGRAM(s) Oral two times a day  pregabalin 100 milliGRAM(s) Oral three times a day    MEDICATIONS  (PRN):  acetaminophen   Tablet .. 650 milliGRAM(s) Oral every 6 hours PRN Mild Pain (1 - 3)  ALBUTerol/ipratropium for Nebulization 3 milliLiter(s) Nebulizer every 6 hours PRN Shortness of Breath and/or Wheezing  traMADol 50 milliGRAM(s) Oral every 6 hours PRN Moderate to Severe Pain      Vital Signs Last 24 Hrs  T(C): 36.7 (07 Jul 2019 05:24), Max: 37.4 (06 Jul 2019 15:03)  T(F): 98 (07 Jul 2019 05:24), Max: 99.3 (06 Jul 2019 15:03)  HR: 84 (07 Jul 2019 05:24) (84 - 101)  BP: 139/73 (07 Jul 2019 05:24) (112/48 - 139/73)  BP(mean): --  RR: 18 (07 Jul 2019 05:24) (15 - 18)  SpO2: 99% (07 Jul 2019 05:24) (82% - 99%)  CAPILLARY BLOOD GLUCOSE        I&O's Summary      PHYSICAL EXAM:  GENERAL: NAD, well-groomed, well-developed  HEAD:  Atraumatic, Normocephalic  EYES: EOMI, PERRLA, conjunctiva and sclera clear  ENMT: dry mucous membranes with good dentition   NECK: Supple,   NERVOUS SYSTEM:  Alert & Oriented X3, Good concentration  CHEST/LUNG: + crackles at base b/l  HEART: Regular rate and rhythm; normal S1, S2. No murmurs, rubs, or gallops  ABDOMEN: Soft, Nontender, Nondistended; Bowel sounds present  EXTREMITIES:  1+ on R. b/l pitting edema LE. L foot more erythematous than R  LYMPH: No lymphadenopathy noted  SKIN: L foot with 4x4 erythematous area, edematous, no fluctuance noted, no TTP      LABS:                        11.6   9.60  )-----------( 149      ( 06 Jul 2019 07:19 )             36.8     WBC Trend: 9.60<--, 9.81<--  07-06    142  |  104  |  13  ----------------------------<  103<H>  3.5   |  25  |  0.45<L>    Ca    8.4      06 Jul 2019 07:30  Phos  2.9     07-06  Mg     1.6     07-06    TPro  6.4  /  Alb  2.9<L>  /  TBili  1.6<H>  /  DBili  x   /  AST  18  /  ALT  16  /  AlkPhos  75  07-06    Creatinine Trend: 0.45<--, 0.54<--  PT/INR - ( 06 Jul 2019 07:19 )   PT: 34.3 SEC;   INR: 2.91          PTT - ( 05 Jul 2019 21:56 )  PTT:42.4 SEC            RADIOLOGY & ADDITIONAL TESTS:    Imaging Personally Reviewed:    Consultant(s) Notes Reviewed:      Care Discussed with Consultants/Other Providers:

## 2019-07-07 NOTE — PROGRESS NOTE ADULT - PROBLEM SELECTOR PLAN 6
Chronic. On Lyrica and Tramadol. [facility] Reference #[id number].  - C/w Lyrica 100 mg tid  - C/w Tramadol 50 mg q6hrs PRN for moderate-severe pain

## 2019-07-07 NOTE — PROGRESS NOTE ADULT - PROBLEM SELECTOR PLAN 3
- INR currently therapeutic at 2.9. if for unprovoked DVT.   - per pharmacy, patient prescribed 4mg coumadin but has not picked it up   - Will need to determine reason pt is on coumadin, especially given recent falls, and c/w daily INR and coumadin dosing if needed. - INR currently therapeutic at 2.9. if for unprovoked DVT.   - per pharmacy, patient prescribed 4mg coumadin but has not picked it up   -MED REC on Monday and f/u as to why pt was on coumadin --> clarify with PCP

## 2019-07-07 NOTE — PROGRESS NOTE ADULT - PROBLEM SELECTOR PLAN 7
- DVT ppx: On coumadin at home. Will hold coumadin for now until indication for anticoagulation determined and patient current abx regimen can raise INR   - Diet: DASH/TLC  - Dispo: PT eval pending

## 2019-07-07 NOTE — PROGRESS NOTE ADULT - ASSESSMENT
73 yo woman with history of HTN, COPD (on intermittent home O2 of 3L NC), unprovoked b/l LE DVT (~20 years ago, on coumadin), chronic b/l LE edema (on Lasix), and b/l knee osteoarthritis with chronic b/l LE pain presents with 5 days of left foot pain, swelling, and redness, admitted with nonpurulent left foot cellulitis and difficulty with ambulation. Cellulitis is improv

## 2019-07-07 NOTE — PROGRESS NOTE ADULT - PROBLEM SELECTOR PLAN 1
Pt likely with mild, nonpurulent left foot cellulitis. No systemic signs of infection, as pt afebrile, and with no leukocytosis. LLE US dopplers negative for DVT.  - C/w abx with PO Keflex 500 mg qid x 5 days  - F/u blood cxs  - Tylenol PRN for mild pain and Tramadol PRN for moderate-severe pain  - PT eval  - Fall risk protocol Pt likely with mild, nonpurulent left foot cellulitis. No systemic signs of infection, as pt afebrile, and with no leukocytosis. LLE US dopplers negative for DVT.  - C/w abx with PO Keflex 500 mg qid x 5 days  - Bcx growing CON, repeat blood cultures ordered  - Tylenol PRN for mild pain and Tramadol PRN for moderate-severe pain  - PT eval  - Fall risk protocol

## 2019-07-07 NOTE — PROGRESS NOTE ADULT - PROBLEM SELECTOR PLAN 8
- Verifed with pharmacy home medications  - Per [facility], pt regularly prescribed diazepam 5 mg, last in mid-June. However, unclear if pt presently taking it, as pt did not mention diazepam as a home medication during interview. - Verifed with pharmacy home medications  - Will call PCP on Monday to obtain clarification on coumadin and lisinopril   - Per [facility], pt regularly prescribed diazepam 5 mg, last in mid-June. However, unclear if pt presently taking it, as pt did not mention diazepam as a home medication during interview.

## 2019-07-07 NOTE — PROGRESS NOTE ADULT - PROBLEM SELECTOR PLAN 5
Chronic. On PO Lasix.  - C/w PO Lasix 20 mg bid Chronic. On PO Lasix.  - C/w PO Lasix 20 mg bid  - will obtain R extremity dupplex

## 2019-07-08 DIAGNOSIS — R78.81 BACTEREMIA: ICD-10-CM

## 2019-07-08 DIAGNOSIS — M79.672 PAIN IN LEFT FOOT: ICD-10-CM

## 2019-07-08 LAB
ALBUMIN SERPL ELPH-MCNC: 2.7 G/DL — LOW (ref 3.3–5)
ALP SERPL-CCNC: 62 U/L — SIGNIFICANT CHANGE UP (ref 40–120)
ALT FLD-CCNC: 11 U/L — SIGNIFICANT CHANGE UP (ref 4–33)
ANION GAP SERPL CALC-SCNC: 10 MMO/L — SIGNIFICANT CHANGE UP (ref 7–14)
AST SERPL-CCNC: 12 U/L — SIGNIFICANT CHANGE UP (ref 4–32)
BILIRUB SERPL-MCNC: 1 MG/DL — SIGNIFICANT CHANGE UP (ref 0.2–1.2)
BUN SERPL-MCNC: 12 MG/DL — SIGNIFICANT CHANGE UP (ref 7–23)
CALCIUM SERPL-MCNC: 8.6 MG/DL — SIGNIFICANT CHANGE UP (ref 8.4–10.5)
CHLORIDE SERPL-SCNC: 101 MMOL/L — SIGNIFICANT CHANGE UP (ref 98–107)
CO2 SERPL-SCNC: 30 MMOL/L — SIGNIFICANT CHANGE UP (ref 22–31)
CREAT SERPL-MCNC: 0.42 MG/DL — LOW (ref 0.5–1.3)
GLUCOSE SERPL-MCNC: 99 MG/DL — SIGNIFICANT CHANGE UP (ref 70–99)
HCT VFR BLD CALC: 32.4 % — LOW (ref 34.5–45)
HGB BLD-MCNC: 10 G/DL — LOW (ref 11.5–15.5)
INR BLD: 1.39 — HIGH (ref 0.88–1.17)
MCHC RBC-ENTMCNC: 26.6 PG — LOW (ref 27–34)
MCHC RBC-ENTMCNC: 30.9 % — LOW (ref 32–36)
MCV RBC AUTO: 86.2 FL — SIGNIFICANT CHANGE UP (ref 80–100)
NRBC # FLD: 0 K/UL — SIGNIFICANT CHANGE UP (ref 0–0)
ORGANISM # SPEC MICROSCOPIC CNT: SIGNIFICANT CHANGE UP
ORGANISM # SPEC MICROSCOPIC CNT: SIGNIFICANT CHANGE UP
PLATELET # BLD AUTO: 158 K/UL — SIGNIFICANT CHANGE UP (ref 150–400)
PMV BLD: 11.5 FL — SIGNIFICANT CHANGE UP (ref 7–13)
POTASSIUM SERPL-MCNC: 3.5 MMOL/L — SIGNIFICANT CHANGE UP (ref 3.5–5.3)
POTASSIUM SERPL-SCNC: 3.5 MMOL/L — SIGNIFICANT CHANGE UP (ref 3.5–5.3)
PROT SERPL-MCNC: 6.3 G/DL — SIGNIFICANT CHANGE UP (ref 6–8.3)
PROTHROM AB SERPL-ACNC: 16 SEC — HIGH (ref 9.8–13.1)
RBC # BLD: 3.76 M/UL — LOW (ref 3.8–5.2)
RBC # FLD: 14.9 % — HIGH (ref 10.3–14.5)
SODIUM SERPL-SCNC: 141 MMOL/L — SIGNIFICANT CHANGE UP (ref 135–145)
SPECIMEN SOURCE: SIGNIFICANT CHANGE UP
SPECIMEN SOURCE: SIGNIFICANT CHANGE UP
WBC # BLD: 6.62 K/UL — SIGNIFICANT CHANGE UP (ref 3.8–10.5)
WBC # FLD AUTO: 6.62 K/UL — SIGNIFICANT CHANGE UP (ref 3.8–10.5)

## 2019-07-08 PROCEDURE — 73630 X-RAY EXAM OF FOOT: CPT | Mod: 26,LT

## 2019-07-08 PROCEDURE — 99233 SBSQ HOSP IP/OBS HIGH 50: CPT | Mod: GC

## 2019-07-08 RX ORDER — WARFARIN SODIUM 2.5 MG/1
4 TABLET ORAL ONCE
Refills: 0 | Status: COMPLETED | OUTPATIENT
Start: 2019-07-08 | End: 2019-07-08

## 2019-07-08 RX ORDER — ENOXAPARIN SODIUM 100 MG/ML
40 INJECTION SUBCUTANEOUS DAILY
Refills: 0 | Status: DISCONTINUED | OUTPATIENT
Start: 2019-07-08 | End: 2019-07-09

## 2019-07-08 RX ADMIN — WARFARIN SODIUM 4 MILLIGRAM(S): 2.5 TABLET ORAL at 17:22

## 2019-07-08 RX ADMIN — Medication 81 MILLIGRAM(S): at 11:23

## 2019-07-08 RX ADMIN — BUDESONIDE AND FORMOTEROL FUMARATE DIHYDRATE 2 PUFF(S): 160; 4.5 AEROSOL RESPIRATORY (INHALATION) at 09:42

## 2019-07-08 RX ADMIN — Medication 20 MILLIGRAM(S): at 17:22

## 2019-07-08 RX ADMIN — Medication 500 MILLIGRAM(S): at 17:22

## 2019-07-08 RX ADMIN — Medication 500 MILLIGRAM(S): at 05:38

## 2019-07-08 RX ADMIN — Medication 100 MILLIGRAM(S): at 05:38

## 2019-07-08 RX ADMIN — Medication 100 MILLIGRAM(S): at 21:03

## 2019-07-08 RX ADMIN — Medication 20 MILLIGRAM(S): at 05:38

## 2019-07-08 RX ADMIN — ENOXAPARIN SODIUM 40 MILLIGRAM(S): 100 INJECTION SUBCUTANEOUS at 12:01

## 2019-07-08 RX ADMIN — Medication 500 MILLIGRAM(S): at 11:23

## 2019-07-08 RX ADMIN — Medication 100 MILLIGRAM(S): at 13:54

## 2019-07-08 RX ADMIN — BUDESONIDE AND FORMOTEROL FUMARATE DIHYDRATE 2 PUFF(S): 160; 4.5 AEROSOL RESPIRATORY (INHALATION) at 21:03

## 2019-07-08 RX ADMIN — Medication 500 MILLIGRAM(S): at 23:09

## 2019-07-08 NOTE — PROGRESS NOTE ADULT - PROBLEM SELECTOR PLAN 8
- Verifed with pharmacy home medications  - Will call PCP on Monday to obtain clarification on coumadin and lisinopril   - Per [facility], pt regularly prescribed diazepam 5 mg, last in mid-June. However, unclear if pt presently taking it, as pt did not mention diazepam as a home medication during interview. - Verifed with pharmacy home medications  - call PCP on today re coumadin and lisinopril   - Per [facility], pt regularly prescribed diazepam 5 mg, last in mid-June. However, unclear if pt presently taking it, as pt did not mention diazepam as a home medication during interview. - DVT ppx: On coumadin at home. f/u PCP re coumadin indication   - Diet: DASH/TLC  - Dispo: PT eval, appropriate for inpt STR. d/w case mgmt Chronic. On Lyrica and Tramadol. [facility] Reference #[id number].  - C/w Lyrica 100 mg tid  - C/w Tramadol 50 mg q6hrs PRN for moderate-severe pain

## 2019-07-08 NOTE — PROGRESS NOTE ADULT - PROBLEM SELECTOR PLAN 4
Per pt, takes lisinopril bid, though dose unknown. Also on PO Lasix for LE edema.  - C/w PO Lasix 20 mg bid  - Will need to determine pt's home dose of lisinopril to continue. Pharmacy has no record of lisinopril in recent medication renewals, will clarify with PCP on Monday Per pt, takes lisinopril bid, though dose unknown. Also on PO Lasix for LE edema.  - C/w PO Lasix 20 mg bid, B/P within control on current regimen  - f/u with PCP home dose of lisinopril - INR currently subtherapeutic at 1.3. if for unprovoked DVT.    - f/u with PCP (Dr. Riggs in [facility]) for indication and home dose of coumadin On intermittent home O2 of 3L NC, usually following exertion. No S/S of exacerbation.   - Duonebs q6hrs PRN  - C/w LABA/ICS with Symbicort (therapeutic interchange for Advair)  - C/w supplemental O2 PRN

## 2019-07-08 NOTE — ADVANCED PRACTICE NURSE CONSULT - ASSESSMENT
A&Ox3, incontinent of urine and stool. Skin warm, dry with increased moisture in intertriginous folds, adequate skin turgor.    Right lateral upper thigh wound of unknown etiology, chronic, measures [id number]. Layer of tissue on wound base with palpation of cotton swab but able to feel hardness of bone beneath the wound base tissue (bone is not exposed in the wound base). No undermining or tunneling. Wound within soft tissue contracture and skin crease have to open skin crease to visualize wound. Unable to visualize depth due to narrow opening. Moderate amount of serosanguinous drainage, no odor. Periwound skin intact, no induration, no increased warmth, no erythema. Goal of care: pack depth, decrease/control bioburden of chronic wound, manage exudate.

## 2019-07-08 NOTE — PROGRESS NOTE ADULT - PROBLEM SELECTOR PROBLEM 5
Lower extremity edema HTN (hypertension) Deep vein thrombosis (DVT) of both lower extremities, unspecified chronicity, unspecified vein

## 2019-07-08 NOTE — PROGRESS NOTE ADULT - PROBLEM SELECTOR PROBLEM 3
Deep vein thrombosis (DVT) of both lower extremities, unspecified chronicity, unspecified vein Chronic obstructive pulmonary disease (COPD) Positive blood culture

## 2019-07-08 NOTE — PROGRESS NOTE ADULT - PROBLEM SELECTOR PROBLEM 4
HTN (hypertension) Deep vein thrombosis (DVT) of both lower extremities, unspecified chronicity, unspecified vein Chronic obstructive pulmonary disease (COPD)

## 2019-07-08 NOTE — PROGRESS NOTE ADULT - PROBLEM SELECTOR PLAN 5
Chronic. On PO Lasix.  - C/w PO Lasix 20 mg bid  - will obtain R extremity duplex Chronic. On PO Lasix.  - C/w PO Lasix 20 mg bid  - will obtain R extremity duplex, r/o DVT Per pt, takes lisinopril bid, though dose unknown. Also on PO Lasix for LE edema.  - C/w PO Lasix 20 mg bid, B/P within control on current regimen  - f/u with PCP home dose of lisinopril - INR currently subtherapeutic at 1.3. if for unprovoked DVT.    - f/u with PCP (Dr. Riggs in [facility]) for indication and home dose of coumadin  - dose patient with coumadin 4mg (home dose) until verified with PCP

## 2019-07-08 NOTE — PROGRESS NOTE ADULT - PROBLEM SELECTOR PLAN 3
- INR currently therapeutic at 1.7. if for unprovoked DVT.   - per pharmacy, patient prescribed 4mg coumadin but has not picked it up   -MED REC on Monday and f/u as to why pt was on coumadin --> clarify with PCP - INR currently subtherapeutic at 1.3. if for unprovoked DVT.    - f/u with PCP (Dr. Riggs in [facility]) for indication and home dose of coumadin On intermittent home O2 of 3L NC, usually following exertion. No S/S of exacerbation.   - Duonebs q6hrs PRN  - C/w LABA/ICS with Symbicort (therapeutic interchange for Advair)  - C/w supplemental O2 PRN - repeat bcx pending  - f/u sensitivities for change in current abx regimen  - c/w keflex 500mg qid

## 2019-07-08 NOTE — PROGRESS NOTE ADULT - PROBLEM SELECTOR PLAN 9
- Verifed with pharmacy home medications  - call PCP on today re coumadin and lisinopril   - Per [facility], pt regularly prescribed diazepam 5 mg, last in mid-June. However, unclear if pt presently taking it, as pt did not mention diazepam as a home medication during interview. - DVT ppx: On coumadin at home. f/u PCP re coumadin indication   - Diet: DASH/TLC  - Dispo: PT eval, appropriate for inpt STR. d/w case mgmt

## 2019-07-08 NOTE — PROGRESS NOTE ADULT - SUBJECTIVE AND OBJECTIVE BOX
MIRLANDE WALLACE  74y  Female      Patient is a 74y old  Female who presents with a chief complaint of L foot pain, swelling, and redness (07 Jul 2019 07:57)  *************************************  Ivette Finney MD. PGY-1  [phone]  Subjective:  INTERVAL HPI/OVERNIGHT EVENTS:      REVIEW OF SYSTEMS:  CONSTITUTIONAL: No fever, weight loss, or fatigue  EYES: No eye pain, visual disturbances, or discharge  ENMT:  No difficulty hearing, tinnitus, vertigo; No sinus or throat pain  NECK: No pain or stiffness  BREASTS: No pain, masses, or nipple discharge  RESPIRATORY: No cough, wheezing, chills or hemoptysis; No shortness of breath  CARDIOVASCULAR: No chest pain, palpitations, dizziness, or leg swelling  GASTROINTESTINAL: No abdominal or epigastric pain. No nausea, vomiting, or hematemesis; No diarrhea or constipation. No melena or hematochezia.  GENITOURINARY: No dysuria, frequency, hematuria, or incontinence  NEUROLOGICAL: No headaches, memory loss, loss of strength, numbness, or tremors  SKIN: No itching, burning, rashes, or lesions   LYMPH NODES: No enlarged glands  ENDOCRINE: No heat or cold intolerance; No hair loss  MUSCULOSKELETAL: No joint pain or swelling; No muscle, back, or extremity pain  PSYCHIATRIC: No depression, anxiety, mood swings, or difficulty sleeping  HEME/LYMPH: No easy bruising, or bleeding gums  ALLERY AND IMMUNOLOGIC: No hives or eczema  FAMILY HISTORY:  No pertinent family history in first degree relatives      Objective:  T(C): 37.6 (07-08-19 @ 05:37), Max: 37.6 (07-07-19 @ 20:48)  HR: 84 (07-08-19 @ 05:37) (84 - 94)  BP: 120/54 (07-08-19 @ 05:37) (120/54 - 128/66)  RR: 18 (07-08-19 @ 05:37) (18 - 19)  SpO2: 95% (07-08-19 @ 05:37) (93% - 95%)  Wt(kg): --Vital Signs Last 24 Hrs  T(C): 37.6 (08 Jul 2019 05:37), Max: 37.6 (07 Jul 2019 20:48)  T(F): 99.6 (08 Jul 2019 05:37), Max: 99.6 (07 Jul 2019 20:48)  HR: 84 (08 Jul 2019 05:37) (84 - 94)  BP: 120/54 (08 Jul 2019 05:37) (120/54 - 128/66)  BP(mean): --  RR: 18 (08 Jul 2019 05:37) (18 - 19)  SpO2: 95% (08 Jul 2019 05:37) (93% - 95%)  No Known Allergies      PHYSICAL EXAM:  GENERAL: NAD, well-groomed, well-developed  HEAD:  Atraumatic, Normocephalic  EYES: EOMI, PERRLA, conjunctiva and sclera clear  ENMT: No tonsillar erythema, exudates, or enlargement; Moist mucous membranes, Good dentition, No lesions  NECK: Supple, No JVD, Normal thyroid  NERVOUS SYSTEM:  Alert & Oriented X3, Good concentration; Motor Strength 5/5 B/L upper and lower extremities; DTRs 2+ intact and symmetric  CHEST/LUNG: Clear to percussion bilaterally; No rales, rhonchi, wheezing, or rubs  HEART: Regular rate and rhythm; No murmurs, rubs, or gallops  ABDOMEN: Soft, Nontender, Nondistended; Bowel sounds present  EXTREMITIES:  2+ Peripheral Pulses, No clubbing, cyanosis, or edema  LYMPH: No lymphadenopathy noted  SKIN: No rashes or lesions    Consultant(s) Notes Reviewed:  [x ] YES  [ ] NO  Care Discussed with Consultants/Other Providers [ x] YES  [ ] NO    LABS:      RADIOLOGY & ADDITIONAL TESTS:    Imaging Personally Reviewed:  [ ] YES  [ ] NO  acetaminophen   Tablet .. 650 milliGRAM(s) Oral every 6 hours PRN  ALBUTerol/ipratropium for Nebulization 3 milliLiter(s) Nebulizer every 6 hours PRN  aspirin enteric coated 81 milliGRAM(s) Oral daily  buDESOnide 160 MICROgram(s)/formoterol 4.5 MICROgram(s) Inhaler 2 Puff(s) Inhalation two times a day  cephalexin 500 milliGRAM(s) Oral four times a day  furosemide    Tablet 20 milliGRAM(s) Oral two times a day  pregabalin 100 milliGRAM(s) Oral three times a day  traMADol 50 milliGRAM(s) Oral every 6 hours PRN      HEALTH ISSUES - PROBLEM Dx:  Medication management: Medication management  Need for prophylactic measure: Need for prophylactic measure  Lower extremity pain, bilateral: Lower extremity pain, bilateral  Lower extremity edema: Lower extremity edema  HTN (hypertension): HTN (hypertension)  Deep vein thrombosis (DVT) of both lower extremities, unspecified chronicity, unspecified vein: Deep vein thrombosis (DVT) of both lower extremities, unspecified chronicity, unspecified vein  Chronic obstructive pulmonary disease (COPD): Chronic obstructive pulmonary disease (COPD)  Cellulitis of foot, left: Cellulitis of foot, left MADISON MIRLANDE  74y  Female      Patient is a 74y old  Female who presents with a chief complaint of L foot pain, swelling, and redness for 5 days.   *************************************  Ivette Finney MD. PGY-1  [phone]  Subjective:  INTERVAL HPI/OVERNIGHT EVENTS: ALISSA. The patient reports that the cellulitis has not improved since saturday and that her L foot is becoming more tender, she is unable to put weight on it. She denies chest pain, shortness of breath, subjective fevers, abdominal pain. The patient was informed that she would be likely d/c'd to inpatient rehab and she was understanding and in agreement with this disposition plan.       REVIEW OF SYSTEMS:  CONSTITUTIONAL: No fever, weight loss, or fatigue  NECK: No pain or stiffness  RESPIRATORY: No cough, wheezing or shortness of breath  CARDIOVASCULAR: No chest pain, palpitations, dizziness, +leg swelling  GASTROINTESTINAL: No abdominal or epigastric pain. No nausea, vomiting, or diarrhea or constipation.   GENITOURINARY: No dysuria, frequency, hematuria, or incontinence  NEUROLOGICAL: No headaches, memory loss, loss of strength, numbness, or tremors  SKIN: No itching, burning, rashes, or lesions   MUSCULOSKELETAL: No joint pain or swelling; No muscle, back, or extremity pain  PSYCHIATRIC: No depression, anxiety, mood swings, or difficulty sleeping    FAMILY HISTORY:  No pertinent family history in first degree relatives      Objective:  Vital Signs Last 24 Hrs  T(C): 37.6 (08 Jul 2019 05:37), Max: 37.6 (07 Jul 2019 20:48)  T(F): 99.6 (08 Jul 2019 05:37), Max: 99.6 (07 Jul 2019 20:48)  HR: 84 (08 Jul 2019 05:37) (84 - 94)  BP: 120/54 (08 Jul 2019 05:37) (120/54 - 128/66)  BP(mean): --  RR: 18 (08 Jul 2019 05:37) (18 - 19)  SpO2: 95% (08 Jul 2019 05:37) (93% - 95%)      PHYSICAL EXAM:  GENERAL: NAD, well-groomed, well-developed  HEAD:  Atraumatic, Normocephalic  EYES: EOMI, PERRLA, conjunctiva and sclera clear  ENMT: Moist mucous membranes, Good dentition, No lesions  NECK: Supple, No JVD,   NERVOUS SYSTEM:  Alert & Oriented X3, Good concentration  CHEST/LUNG: crackles at bases, good inspiratory effort. No wheezing or rales   HEART: Regular rate and rhythm; No murmurs, rubs, or gallops  ABDOMEN: Soft, Nontender, Nondistended; Bowel sounds present  EXTREMITIES:  2+ Peripheral Pulses, no clubbing or cyanosis. Chronic LLE/RLE edema unchanged. L foot with 4x4cm round lesion (-) induration/fluctuation. +tenderness at ventral foot  LYMPH: No lymphadenopathy noted  SKIN: No rashes or lesions    Consultant(s) Notes Reviewed:  [x ] YES  [ ] NO  Care Discussed with Consultants/Other Providers [ x] YES  [ ] NO    LABS:  CBC Full  -  ( 08 Jul 2019 06:30 )  WBC Count : 6.62 K/uL  RBC Count : 3.76 M/uL  Hemoglobin : 10.0 g/dL  Hematocrit : 32.4 %  Platelet Count - Automated : 158 K/uL  Mean Cell Volume : 86.2 fL  Mean Cell Hemoglobin : 26.6 pg  Mean Cell Hemoglobin Concentration : 30.9 %      07-08    141  |  101  |  12  ----------------------------<  99  3.5   |  30  |  0.42<L>    Ca    8.6      08 Jul 2019 06:30    TPro  6.3  /  Alb  2.7<L>  /  TBili  1.0  /  DBili  x   /  AST  12  /  ALT  11  /  AlkPhos  62  07-08    LIVER FUNCTIONS - ( 08 Jul 2019 06:30 )  Alb: 2.7 g/dL / Pro: 6.3 g/dL / ALK PHOS: 62 u/L / ALT: 11 u/L / AST: 12 u/L / GGT: x                 RADIOLOGY & ADDITIONAL TESTS: RLE Venous Duplex pending       MEDICATIONS  (STANDING):  aspirin enteric coated 81 milliGRAM(s) Oral daily  buDESOnide 160 MICROgram(s)/formoterol 4.5 MICROgram(s) Inhaler 2 Puff(s) Inhalation two times a day  cephalexin 500 milliGRAM(s) Oral four times a day  furosemide    Tablet 20 milliGRAM(s) Oral two times a day  pregabalin 100 milliGRAM(s) Oral three times a day    MEDICATIONS  (PRN):  acetaminophen   Tablet .. 650 milliGRAM(s) Oral every 6 hours PRN Mild Pain (1 - 3)  ALBUTerol/ipratropium for Nebulization 3 milliLiter(s) Nebulizer every 6 hours PRN Shortness of Breath and/or Wheezing  traMADol 50 milliGRAM(s) Oral every 6 hours PRN Moderate to Severe Pain      HEALTH ISSUES - PROBLEM Dx:  Medication management: Medication management  Need for prophylactic measure: Need for prophylactic measure  Lower extremity pain, bilateral: Lower extremity pain, bilateral  Lower extremity edema: Lower extremity edema  HTN (hypertension): HTN (hypertension)  Deep vein thrombosis (DVT) of both lower extremities, unspecified chronicity, unspecified vein: Deep vein thrombosis (DVT) of both lower extremities, unspecified chronicity, unspecified vein  Chronic obstructive pulmonary disease (COPD): Chronic obstructive pulmonary disease (COPD)  Cellulitis of foot, left: Cellulitis of foot, left

## 2019-07-08 NOTE — PROGRESS NOTE ADULT - PROBLEM SELECTOR PLAN 2
On intermittent home O2 of 3L NC, usually following exertion. No S/S of exacerbation.   - Duonebs q6hrs PRN  - C/w LABA/ICS with Symbicort (therapeutic interchange for Advair)  - C/w supplemental O2 PRN - repeat bcx pending  - f/u sensitivities for change in current abx regimen - Patient c/o new ventral foot pain on L side   - obtain L foot XR to r/o fracture

## 2019-07-08 NOTE — PROGRESS NOTE ADULT - ASSESSMENT
75 yo woman with history of HTN, COPD (on intermittent home O2 of 3L NC), unprovoked b/l LE DVT (~20 years ago, on coumadin), chronic b/l LE edema (on Lasix), and b/l knee osteoarthritis with chronic b/l LE pain presents with 5 days of left foot pain, swelling, and redness, admitted with nonpurulent left foot cellulitis and difficulty with ambulation. Cellulitis is improving 75 yo woman with history of HTN, COPD (on intermittent home O2 of 3L NC), unprovoked b/l LE DVT (~20 years ago, coumadin held pending verification of indication), chronic b/l LE edema (on Lasix), and b/l knee osteoarthritis with chronic b/l LE pain presents with 5 days of left foot pain, swelling, and redness, admitted with nonpurulent left foot cellulitis and difficulty with ambulation. BCx +ve for coagulase negative staph. sensitivities pending. Cellulitis unchanged since Saturday with increasing pain, patient is currently on keflex PO. 73 yo woman with history of HTN, COPD (on intermittent home O2 of 3L NC), unprovoked b/l LE DVT (~20 years ago, coumadin held pending verification of indication), chronic b/l LE edema (on Lasix), and b/l knee osteoarthritis with chronic b/l LE pain presents with 5 days of left foot pain, swelling, and redness, admitted with nonpurulent left foot cellulitis and difficulty with ambulation. BCx +ve for coagulase negative staph. sensitivities pending. Cellulitis unchanged since Saturday but pain is increasing at bottom of foot, needs further w/u.

## 2019-07-08 NOTE — PROGRESS NOTE ADULT - PROBLEM SELECTOR PLAN 7
- DVT ppx: On coumadin at home. Will hold coumadin for now until indication for anticoagulation determined and patient current abx regimen can raise INR   - Diet: DASH/TLC  - Dispo: PT eval, appropriate for inpt STR. d/w case mgmt - DVT ppx: On coumadin at home. f/u PCP re coumadin indication   - Diet: DASH/TLC  - Dispo: PT eval, appropriate for inpt STR. d/w case mgmt Chronic. On Lyrica and Tramadol. Istop Reference #[id number].  - C/w Lyrica 100 mg tid  - C/w Tramadol 50 mg q6hrs PRN for moderate-severe pain Chronic. On PO Lasix.  - C/w PO Lasix 20 mg bid  - will obtain R extremity duplex, r/o DVT

## 2019-07-08 NOTE — ADVANCED PRACTICE NURSE CONSULT - REASON FOR CONSULT
Patient seen on skin care rounds after wound care referral received for assessment of skin impairment and recommendations of topical management. Chart reviewed: Serum albumin (N), Pedro (N), BMI (N), patient interviewed. Patient H/O HTN, COPD (on intermittent home O2 of 3L NC), b/l LE DVT (~20 years ago, on coumadin), chronic b/l LE edema (on Lasix), and b/l knee osteoarthritis with chronic b/l LE pain presents with 5 days of left foot pain, swelling, and redness. As per H&P, patient states that she was discharged from [facility] ~1 week ago after a 38-day stay there following admission at [facility] for a fall. Shortly after returning home from [facility], pt sustained another fall, as her rolling walker slipped when pt attempted to grab it while in her bathroom. Pt did not sustain any head trauma or loss of consciousness but had left foot pain and swelling immediately after the fall. Pt was evaluated in the ED at [facility] and discharged home after all imaging came back negative. Pt, however, continued to have left foot pain and swelling, for which she started applying Aspercreme. A couple days later, pt noticed redness that spread throughout the top of her left foot, causing severe pain and difficulty with ambulation. Admitted with cellulitis. Patient reports that she has a history of right hip wound for more than 5 years, and is followed outpatient by a wound center every other week and VNS performs dressing change 3 times per week, the wound never closed but does get smaller at times.

## 2019-07-08 NOTE — PROGRESS NOTE ADULT - PROBLEM SELECTOR PLAN 1
Pt likely with mild, nonpurulent left foot cellulitis. No systemic signs of infection, as pt afebrile, and with no leukocytosis. LLE US dopplers negative for DVT.  - C/w abx with PO Keflex 500 mg qid x 5 days  - Bcx growing CON, repeat blood cultures ordered  - Tylenol PRN for mild pain and Tramadol PRN for moderate-severe pain  - Fall risk protocol

## 2019-07-08 NOTE — PROGRESS NOTE ADULT - ATTENDING COMMENTS
Agree with above with following addendum;    #foot pain  -patient terated for non-purulent cellulitis, examined foot otday, lesion si discrete 3x3cm erythematous discrete lesion, atypical of cellulitis.  -pain at area, also on plantar aspect of foot and lateral aspect of foot  -erythema loks more like capillary leak and possible trauma to area.  -obtain XRay of foot to r/o occult fracture as pain is significant and inability to ambulate.  -per resident on team, there was surrounding errythema that is resolved.    #?Anticoagulation  -unclear etiology, was held over weekend.  -place for now on DVT proph and dose coumadin until speak to PMD to find out rationale behind coumadin use.  -if legitimate reason may need to bridge.  -coumadin 4mg tonight and lovenox 40mg.     rest as above Agree with above with following addendum;    #foot pain  -patient terated for non-purulent cellulitis, examined foot otday, lesion si discrete 3x3cm erythematous discrete lesion, atypical of cellulitis.  -pain at area, also on plantar aspect of foot and lateral aspect of foot  -erythema loks more like capillary leak and possible trauma to area.  +1DP pulse, do not think arterial disease.  -obtain XRay of foot to r/o occult fracture as pain is significant and inability to ambulate.  -per resident on team, there was surrounding errythema that is resolved.    #?Anticoagulation  -unclear etiology, was held over weekend.  -place for now on DVT proph and dose coumadin until speak to PMD to find out rationale behind coumadin use.  -if legitimate reason may need to bridge.  -coumadin 4mg tonight and lovenox 40mg.     rest as above

## 2019-07-08 NOTE — ADVANCED PRACTICE NURSE CONSULT - RECOMMEDATIONS
Recommend R/O osteomyelitis of right lateral upper thigh wound, chronic wound, ESR increased.    Right lateral upper thigh: Cleanse with SAF-clens, rinse with NS, pat dry. Apply Liquid barrier film to periwound skin. Lightly pack depth with Aquacel AG hydrofiber, cover with foam with border. Change daily.    Continue low air loss bed therapy, continue to turn & reposition q2h, continue moisture management with barrier creams & single breathable pad, continue measures to decrease friction/shear/pressure.     Please call wound care service line is further assistance is needed ([phone]).

## 2019-07-08 NOTE — PROGRESS NOTE ADULT - PROBLEM SELECTOR PLAN 6
Chronic. On Lyrica and Tramadol. Istop Reference #[id number].  - C/w Lyrica 100 mg tid  - C/w Tramadol 50 mg q6hrs PRN for moderate-severe pain Chronic. On PO Lasix.  - C/w PO Lasix 20 mg bid  - will obtain R extremity duplex, r/o DVT Per pt, takes lisinopril bid, though dose unknown. Also on PO Lasix for LE edema.  - C/w PO Lasix 20 mg bid, B/P within control on current regimen  - f/u with PCP home dose of lisinopril

## 2019-07-09 VITALS
OXYGEN SATURATION: 95 % | DIASTOLIC BLOOD PRESSURE: 54 MMHG | HEART RATE: 82 BPM | RESPIRATION RATE: 17 BRPM | SYSTOLIC BLOOD PRESSURE: 143 MMHG | TEMPERATURE: 99 F

## 2019-07-09 DIAGNOSIS — I48.91 UNSPECIFIED ATRIAL FIBRILLATION: ICD-10-CM

## 2019-07-09 LAB
-  CEFAZOLIN: SIGNIFICANT CHANGE UP
-  CIPROFLOXACIN: SIGNIFICANT CHANGE UP
-  CLINDAMYCIN: SIGNIFICANT CHANGE UP
-  COAGULASE NEGATIVE STAPHYLOCOCCUS: SIGNIFICANT CHANGE UP
-  ERYTHROMYCIN: SIGNIFICANT CHANGE UP
-  GENTAMICIN: SIGNIFICANT CHANGE UP
-  LEVOFLOXACIN: SIGNIFICANT CHANGE UP
-  MOXIFLOXACIN(AEROBIC): SIGNIFICANT CHANGE UP
-  OXACILLIN: SIGNIFICANT CHANGE UP
-  PENICILLIN: SIGNIFICANT CHANGE UP
-  RIFAMPIN.: SIGNIFICANT CHANGE UP
-  TETRACYCLINE: SIGNIFICANT CHANGE UP
-  TRIMETHOPRIM/SULFAMETHOXAZOLE: SIGNIFICANT CHANGE UP
BACTERIA BLD CULT: SIGNIFICANT CHANGE UP
BACTERIA BLD CULT: SIGNIFICANT CHANGE UP
METHOD TYPE: SIGNIFICANT CHANGE UP
ORGANISM # SPEC MICROSCOPIC CNT: SIGNIFICANT CHANGE UP
SPECIMEN SOURCE: SIGNIFICANT CHANGE UP
SPECIMEN SOURCE: SIGNIFICANT CHANGE UP

## 2019-07-09 PROCEDURE — 99239 HOSP IP/OBS DSCHRG MGMT >30: CPT | Mod: GC

## 2019-07-09 RX ORDER — CEPHALEXIN 500 MG
1 CAPSULE ORAL
Qty: 12 | Refills: 0
Start: 2019-07-09 | End: 2019-07-11

## 2019-07-09 RX ORDER — FLUTICASONE PROPIONATE AND SALMETEROL 50; 250 UG/1; UG/1
1 POWDER ORAL; RESPIRATORY (INHALATION)
Qty: 0 | Refills: 0 | DISCHARGE

## 2019-07-09 RX ORDER — BUDESONIDE AND FORMOTEROL FUMARATE DIHYDRATE 160; 4.5 UG/1; UG/1
2 AEROSOL RESPIRATORY (INHALATION)
Qty: 1 | Refills: 0
Start: 2019-07-09 | End: 2019-08-07

## 2019-07-09 RX ORDER — FUROSEMIDE 40 MG
1 TABLET ORAL
Qty: 60 | Refills: 0
Start: 2019-07-09 | End: 2019-08-07

## 2019-07-09 RX ORDER — CEPHALEXIN 500 MG
1 CAPSULE ORAL
Qty: 4 | Refills: 0
Start: 2019-07-09 | End: 2019-07-09

## 2019-07-09 RX ORDER — FUROSEMIDE 40 MG
1 TABLET ORAL
Qty: 0 | Refills: 0 | DISCHARGE

## 2019-07-09 RX ADMIN — Medication 81 MILLIGRAM(S): at 13:38

## 2019-07-09 RX ADMIN — Medication 500 MILLIGRAM(S): at 13:37

## 2019-07-09 RX ADMIN — Medication 500 MILLIGRAM(S): at 05:07

## 2019-07-09 RX ADMIN — BUDESONIDE AND FORMOTEROL FUMARATE DIHYDRATE 2 PUFF(S): 160; 4.5 AEROSOL RESPIRATORY (INHALATION) at 10:28

## 2019-07-09 RX ADMIN — Medication 100 MILLIGRAM(S): at 05:07

## 2019-07-09 RX ADMIN — ENOXAPARIN SODIUM 40 MILLIGRAM(S): 100 INJECTION SUBCUTANEOUS at 13:37

## 2019-07-09 RX ADMIN — Medication 100 MILLIGRAM(S): at 13:36

## 2019-07-09 RX ADMIN — Medication 20 MILLIGRAM(S): at 05:07

## 2019-07-09 NOTE — PROGRESS NOTE ADULT - PROBLEM SELECTOR PLAN 5
- Pt given coumadin 4mg overnight, INR today is   - Spoke with PCP office, they received pt on coumadin for DVT, pt also has hx of Afib Per pt, takes lisinopril bid, though dose unknown. Also on PO Lasix for LE edema.  - C/w PO Lasix 20 mg bid, B/P within control on current regimen  - PCP last sent lisinopril in 1/2019.

## 2019-07-09 NOTE — DISCHARGE NOTE NURSING/CASE MANAGEMENT/SOCIAL WORK - NSDCDPATPORTLINK_GEN_ALL_CORE
You can access the [facility] Patient Portal, offered by [facility], by registering with the following website: [email or web address]

## 2019-07-09 NOTE — PROGRESS NOTE ADULT - PROBLEM SELECTOR PLAN 1
Pt likely with mild, nonpurulent left foot cellulitis. No systemic signs of infection, as pt afebrile, and with no leukocytosis. LLE US dopplers negative for DVT.  - c/w keflex  qid x 5 days. Today is day 4   - Tylenol PRN for mild pain and Tramadol PRN for moderate-severe pain  - Fall risk protocol Pt likely with mild, nonpurulent left foot cellulitis. No systemic signs of infection, as pt afebrile, and with no leukocytosis. LLE US dopplers negative for DVT.  - c/w keflex  qid x 5 days. Today is day 4 of keflex, day 5 of abx   - Tylenol PRN for mild pain and Tramadol PRN for moderate-severe pain  - Fall risk protocol Pt likely with mild, nonpurulent left foot cellulitis. No systemic signs of infection, as pt afebrile, and with no leukocytosis. LLE US dopplers negative for DVT.  - d/c today to inpatient rehab  - c/w keflex  qid x 5 days. Today is day 4 of keflex, day 5 of abx   - Tylenol PRN for mild pain and Tramadol PRN for moderate-severe pain  - Fall risk protocol Pt likely with mild, nonpurulent left foot cellulitis. No systemic signs of infection, as pt afebrile, and with no leukocytosis. LLE US dopplers negative for DVT.  - d/c today to inpatient rehab  - c/w keflex  qid x 7 days. Today is day 4 of keflex, day 5 of abx   - Tylenol PRN for mild pain and Tramadol PRN for moderate-severe pain  - Fall risk protocol  -given atyptical presentation, no foot wound, still pain after treatment of cellulitis recommend outpatient TASHA/PVR.

## 2019-07-09 NOTE — PROGRESS NOTE ADULT - PROBLEM SELECTOR PLAN 9
- DVT ppx: On coumadin at home. f/u PCP re coumadin indication   - Diet: DASH/TLC  - Dispo: PT eval, appropriate for inpt STR. d/w case mgmt - Verifed with pharmacy home medications  - Per Istop, pt regularly prescribed diazepam 5 mg, last in mid-June. However, unclear if pt presently taking it, as pt did not mention diazepam as a home medication during interview. - Per Dr. Riggs, patient has hx of AFib, more probable reason why pt is receiving coumadin. CHADS VASC score of 4  - not currently rate or rhythm controlled, patient will follow up with primary care provider for further treatment

## 2019-07-09 NOTE — PROGRESS NOTE ADULT - PROBLEM SELECTOR PLAN 6
Per pt, takes lisinopril bid, though dose unknown. Also on PO Lasix for LE edema.  - C/w PO Lasix 20 mg bid, B/P within control on current regimen  - PCP last sent lisinopril in 1/2019. Chronic. On PO Lasix.  - C/w PO Lasix 20 mg bid

## 2019-07-09 NOTE — PROGRESS NOTE ADULT - PROBLEM SELECTOR PLAN 4
On intermittent home O2 of 3L NC, usually following exertion. No S/S of exacerbation.   - Duonebs q6hrs PRN  - C/w LABA/ICS with Symbicort (therapeutic interchange for Advair)  - C/w supplemental O2 PRN - Pt given coumadin 4mg overnight, INR today is pending   - Spoke with PCP office, they received pt on coumadin for DVT, pt also has hx of Afib - Pt given coumadin 4mg overnight  - Spoke with PCP office, they received pt on coumadin, unclear about DVT hx  - f/u with facility for daily PT/INR and coumadin dosing

## 2019-07-09 NOTE — PROGRESS NOTE ADULT - ATTENDING COMMENTS
Agree with above. Patient remains afebrile with normal WBC.  Pain improving in foot. foot Xray reviewed, no fracture, calcaneal enthesysis, possible cause of plantar pain.  Recommend ice, elevation of foot and continued abx for 7 days.  Resident emailed [facility] for new apointment as patient wants to change primary providers. For patient's anticoagulation, it appears is on A/C for her a-fib and not for DVT (per domietn only had one, no contradicting evidence from patient's pmd, though limited information. Given CHADVASC-4, will not bridge, will dose coumadin and rehab can check daily INR for goal INR 2-3.   D/C planning 39 minutes to rehab.

## 2019-07-09 NOTE — PROGRESS NOTE ADULT - PROBLEM SELECTOR PROBLEM 5
Deep vein thrombosis (DVT) of both lower extremities, unspecified chronicity, unspecified vein HTN (hypertension)

## 2019-07-09 NOTE — PROGRESS NOTE ADULT - PROBLEM SELECTOR PLAN 7
Chronic. On PO Lasix.  - C/w PO Lasix 20 mg bid  - will obtain R extremity duplex, r/o DVT Chronic. On Lyrica and Tramadol. Istop Reference #[id number].  - C/w Lyrica 100 mg tid  - C/w Tramadol 50 mg q6hrs PRN for moderate-severe pain

## 2019-07-09 NOTE — PROGRESS NOTE ADULT - PROBLEM SELECTOR PROBLEM 4
Chronic obstructive pulmonary disease (COPD) Deep vein thrombosis (DVT) of both lower extremities, unspecified chronicity, unspecified vein

## 2019-07-09 NOTE — PROGRESS NOTE ADULT - PROBLEM SELECTOR PLAN 10
- Verifed with pharmacy home medications  - call PCP on today re coumadin and lisinopril   - Per Istop, pt regularly prescribed diazepam 5 mg, last in mid-June. However, unclear if pt presently taking it, as pt did not mention diazepam as a home medication during interview.
- Verifed with pharmacy home medications  - call PCP on today re coumadin and lisinopril   - Per Istop, pt regularly prescribed diazepam 5 mg, last in mid-June. However, unclear if pt presently taking it, as pt did not mention diazepam as a home medication during interview.

## 2019-07-09 NOTE — PROGRESS NOTE ADULT - SUBJECTIVE AND OBJECTIVE BOX
MIRLANDE WALLACE  74y  Female      Patient is a 74y old  Female who presents with a chief complaint of L foot pain, swelling, and redness (08 Jul 2019 06:26)  **********************************************************  Ivette Finney MD. PGY-1  [id number]      Subjective:   INTERVAL HPI/OVERNIGHT EVENTS: ALISSA. The patient reports the pain on the bottom       REVIEW OF SYSTEMS:  CONSTITUTIONAL: No fever, weight loss, or fatigue  EYES: No eye pain, visual disturbances, or discharge  ENMT:  No difficulty hearing, tinnitus, vertigo; No sinus or throat pain  NECK: No pain or stiffness  BREASTS: No pain, masses, or nipple discharge  RESPIRATORY: No cough, wheezing, chills or hemoptysis; No shortness of breath  CARDIOVASCULAR: No chest pain, palpitations, dizziness, or leg swelling  GASTROINTESTINAL: No abdominal or epigastric pain. No nausea, vomiting, or hematemesis; No diarrhea or constipation. No melena or hematochezia.  GENITOURINARY: No dysuria, frequency, hematuria, or incontinence  NEUROLOGICAL: No headaches, memory loss, loss of strength, numbness, or tremors  SKIN: No itching, burning, rashes, or lesions   LYMPH NODES: No enlarged glands  ENDOCRINE: No heat or cold intolerance; No hair loss  MUSCULOSKELETAL: No joint pain or swelling; No muscle, back, or extremity pain  PSYCHIATRIC: No depression, anxiety, mood swings, or difficulty sleeping  HEME/LYMPH: No easy bruising, or bleeding gums  ALLERY AND IMMUNOLOGIC: No hives or eczema  FAMILY HISTORY:  No pertinent family history in first degree relatives    Objective:  Vital Signs Last 24 Hrs  T(C): 37.3 (09 Jul 2019 04:52), Max: 37.3 (09 Jul 2019 04:52)  T(F): 99.2 (09 Jul 2019 04:52), Max: 99.2 (09 Jul 2019 04:52)  HR: 70 (09 Jul 2019 04:52) (70 - 88)  BP: 154/63 (09 Jul 2019 04:52) (128/54 - 154/63)  BP(mean): --  RR: 18 (09 Jul 2019 04:52) (17 - 19)  SpO2: 96% (09 Jul 2019 04:52) (94% - 96%)    PHYSICAL EXAM:  GENERAL: NAD, well-groomed, well-developed  HEAD:  Atraumatic, Normocephalic  EYES: EOMI, PERRLA, conjunctiva and sclera clear  ENMT: No tonsillar erythema, exudates, or enlargement; Moist mucous membranes, Good dentition, No lesions  NECK: Supple, No JVD, Normal thyroid  NERVOUS SYSTEM:  Alert & Oriented X3, Good concentration; Motor Strength 5/5 B/L upper and lower extremities; DTRs 2+ intact and symmetric  CHEST/LUNG: Clear to percussion bilaterally; No rales, rhonchi, wheezing, or rubs  HEART: Regular rate and rhythm; No murmurs, rubs, or gallops  ABDOMEN: Soft, Nontender, Nondistended; Bowel sounds present  EXTREMITIES:  2+ Peripheral Pulses, No clubbing, cyanosis, or edema  LYMPH: No lymphadenopathy noted  SKIN: No rashes or lesions    Consultant(s) Notes Reviewed:  [x ] YES  [ ] NO  Care Discussed with Consultants/Other Providers [ x] YES  [ ] NO    LABS:      RADIOLOGY & ADDITIONAL TESTS: XR L FOOT 7/8/19    Imaging Personally Reviewed:  [ x] YES  [ ] NO      MEDICATIONS  (STANDING):  aspirin enteric coated 81 milliGRAM(s) Oral daily  buDESOnide 160 MICROgram(s)/formoterol 4.5 MICROgram(s) Inhaler 2 Puff(s) Inhalation two times a day  cephalexin 500 milliGRAM(s) Oral four times a day  enoxaparin Injectable 40 milliGRAM(s) SubCutaneous daily  furosemide    Tablet 20 milliGRAM(s) Oral two times a day  pregabalin 100 milliGRAM(s) Oral three times a day    MEDICATIONS  (PRN):  acetaminophen   Tablet .. 650 milliGRAM(s) Oral every 6 hours PRN Mild Pain (1 - 3)  ALBUTerol/ipratropium for Nebulization 3 milliLiter(s) Nebulizer every 6 hours PRN Shortness of Breath and/or Wheezing  traMADol 50 milliGRAM(s) Oral every 6 hours PRN Moderate to Severe Pain MIRLANDE WALLACE  74y  Female      Patient is a 74y old  Female who presents with a chief complaint of L foot pain, swelling, and redness (08 Jul 2019 06:26)  **********************************************************  Ivette Finney MD. PGY-1  [id number]      Subjective:   INTERVAL HPI/OVERNIGHT EVENTS: ALISSA. The patient reports the pain on the bottom of her foot is slightly improved and she is trying to walk on it a bit more. She denies chest pain, SOB, abdominal pain, fevers, or cough.       REVIEW OF SYSTEMS: All ROS negative except listed above.     FAMILY HISTORY:  No pertinent family history in first degree relatives    Objective:  Vital Signs Last 24 Hrs  T(C): 37.3 (09 Jul 2019 04:52), Max: 37.3 (09 Jul 2019 04:52)  T(F): 99.2 (09 Jul 2019 04:52), Max: 99.2 (09 Jul 2019 04:52)  HR: 70 (09 Jul 2019 04:52) (70 - 88)  BP: 154/63 (09 Jul 2019 04:52) (128/54 - 154/63)  BP(mean): --  RR: 18 (09 Jul 2019 04:52) (17 - 19)  SpO2: 96% (09 Jul 2019 04:52) (94% - 96%)    PHYSICAL EXAM:  GENERAL: NAD, well-groomed, well-developed, with 2L NC   HEAD:  Atraumatic, Normocephalic  EYES: EOMI, PERRLA, conjunctiva and sclera clear  ENMT: Moist mucous membranes, Good dentition, No lesions  NECK: Supple, No JVD,   NERVOUS SYSTEM:  Alert & Oriented X3, Good concentration  CHEST/LUNG: CTA b/l with decreased breath sounds,   HEART: Regular rate and rhythm; No murmurs, rubs, or gallops  ABDOMEN: Soft, Nontender, Nondistended; Bowel sounds present  EXTREMITIES: R LE DP pulse 2+, L LE DP pulse 1+. L foot with round erythematous 4cm diameter round lesion. -ve induration or fluctuation. Chronic R hip wound clean in dressing   SKIN: No rashes or lesions    Consultant(s) Notes Reviewed:  [x ] YES  [ ] NO: Wound Care   Care Discussed with Consultants/Other Providers [ x] YES  [ ] NO: Wound care eval/recs regarding R hip wound     LABS:  CBC Full  -  ( 08 Jul 2019 06:30 )  WBC Count : 6.62 K/uL  RBC Count : 3.76 M/uL  Hemoglobin : 10.0 g/dL  Hematocrit : 32.4 %  Platelet Count - Automated : 158 K/uL  Mean Cell Volume : 86.2 fL  Mean Cell Hemoglobin : 26.6 pg  Mean Cell Hemoglobin Concentration : 30.9 %    07-08    141  |  101  |  12  ----------------------------<  99  3.5   |  30  |  0.42<L>    Ca    8.6      08 Jul 2019 06:30    TPro  6.3  /  Alb  2.7<L>  /  TBili  1.0  /  DBili  x   /  AST  12  /  ALT  11  /  AlkPhos  62  07-08    LIVER FUNCTIONS - ( 08 Jul 2019 06:30 )  Alb: 2.7 g/dL / Pro: 6.3 g/dL / ALK PHOS: 62 u/L / ALT: 11 u/L / AST: 12 u/L / GGT: x           PT/INR - ( 08 Jul 2019 06:30 )   PT: 16.0 SEC;   INR: 1.39              RADIOLOGY & ADDITIONAL TESTS: XR L FOOT 7/8/19    IMPRESSION: Generalized soft tissue swelling consistent with history of cellulitis.   No tracking soft tissue gas collections, radiopaque foreign bodies, or   gross radiographic evidence for osteomyelitis.    Chronic small corticated ossicle adjacent to lateral 1st IP joint margin.   No dislocations or acute appearing fractures.     Tarsometatarsal alignment maintained without evidence for a Lisfranc   injury.    Preserved visualized joint spaces. No joint margin erosions.    Thick plantar calcaneal enthesophyte.    Generalized osteopenia otherwise no discrete lytic or blastic lesions.     Imaging Personally Reviewed:  [ x] YES  [ ] NO: No evidence of acute fracture or osteomyelitis       MEDICATIONS  (STANDING):  aspirin enteric coated 81 milliGRAM(s) Oral daily  buDESOnide 160 MICROgram(s)/formoterol 4.5 MICROgram(s) Inhaler 2 Puff(s) Inhalation two times a day  cephalexin 500 milliGRAM(s) Oral four times a day  enoxaparin Injectable 40 milliGRAM(s) SubCutaneous daily  furosemide    Tablet 20 milliGRAM(s) Oral two times a day  pregabalin 100 milliGRAM(s) Oral three times a day    MEDICATIONS  (PRN):  acetaminophen   Tablet .. 650 milliGRAM(s) Oral every 6 hours PRN Mild Pain (1 - 3)  ALBUTerol/ipratropium for Nebulization 3 milliLiter(s) Nebulizer every 6 hours PRN Shortness of Breath and/or Wheezing  traMADol 50 milliGRAM(s) Oral every 6 hours PRN Moderate to Severe Pain MIRLANDE WALLACE  74y  Female      Patient is a 74y old  Female who presents with a chief complaint of L foot pain, swelling, and redness (08 Jul 2019 06:26)  **********************************************************  Ivette Finney MD. PGY-1  [id number]      Subjective:   INTERVAL HPI/OVERNIGHT EVENTS: ALISSA. The patient reports the pain on the bottom of her foot is slightly improved and she is trying to walk on it a bit more. She denies chest pain, SOB, abdominal pain, fevers, or cough.       REVIEW OF SYSTEMS: All ROS negative except listed above.     FAMILY HISTORY:  No pertinent family history in first degree relatives    Objective:  Vital Signs Last 24 Hrs  T(C): 37.3 (09 Jul 2019 04:52), Max: 37.3 (09 Jul 2019 04:52)  T(F): 99.2 (09 Jul 2019 04:52), Max: 99.2 (09 Jul 2019 04:52)  HR: 70 (09 Jul 2019 04:52) (70 - 88)  BP: 154/63 (09 Jul 2019 04:52) (128/54 - 154/63)  BP(mean): --  RR: 18 (09 Jul 2019 04:52) (17 - 19)  SpO2: 96% (09 Jul 2019 04:52) (94% - 96%)    PHYSICAL EXAM:  GENERAL: NAD, well-groomed, well-developed, with 2L NC   HEAD:  Atraumatic, Normocephalic  EYES: EOMI, PERRLA, conjunctiva and sclera clear  ENMT: Moist mucous membranes, Good dentition, No lesions  NECK: Supple, No JVD,   NERVOUS SYSTEM:  Alert & Oriented X3, Good concentration  CHEST/LUNG: CTA b/l with decreased breath sounds,   HEART: Regular rate and rhythm; No murmurs, rubs, or gallops  ABDOMEN: Soft, Nontender, Nondistended; Bowel sounds present  EXTREMITIES: R LE DP pulse 2+, L LE DP pulse 1+. L foot with round erythematous 4cm diameter round lesion. -ve induration or fluctuation. ABLE TO BEAR WEIGHT TODAY.   Chronic R hip wound clean in dressing   SKIN: No rashes or lesions    Consultant(s) Notes Reviewed:  [x ] YES  [ ] NO: Wound Care   Care Discussed with Consultants/Other Providers [ x] YES  [ ] NO: Wound care eval/recs regarding R hip wound     LABS:  CBC Full  -  ( 08 Jul 2019 06:30 )  WBC Count : 6.62 K/uL  RBC Count : 3.76 M/uL  Hemoglobin : 10.0 g/dL  Hematocrit : 32.4 %  Platelet Count - Automated : 158 K/uL  Mean Cell Volume : 86.2 fL  Mean Cell Hemoglobin : 26.6 pg  Mean Cell Hemoglobin Concentration : 30.9 %    07-08    141  |  101  |  12  ----------------------------<  99  3.5   |  30  |  0.42<L>    Ca    8.6      08 Jul 2019 06:30    TPro  6.3  /  Alb  2.7<L>  /  TBili  1.0  /  DBili  x   /  AST  12  /  ALT  11  /  AlkPhos  62  07-08    LIVER FUNCTIONS - ( 08 Jul 2019 06:30 )  Alb: 2.7 g/dL / Pro: 6.3 g/dL / ALK PHOS: 62 u/L / ALT: 11 u/L / AST: 12 u/L / GGT: x           PT/INR - ( 08 Jul 2019 06:30 )   PT: 16.0 SEC;   INR: 1.39              RADIOLOGY & ADDITIONAL TESTS: XR L FOOT 7/8/19    IMPRESSION: Generalized soft tissue swelling consistent with history of cellulitis.   No tracking soft tissue gas collections, radiopaque foreign bodies, or   gross radiographic evidence for osteomyelitis.    Chronic small corticated ossicle adjacent to lateral 1st IP joint margin.   No dislocations or acute appearing fractures.     Tarsometatarsal alignment maintained without evidence for a Lisfranc   injury.    Preserved visualized joint spaces. No joint margin erosions.    Thick plantar calcaneal enthesophyte.    Generalized osteopenia otherwise no discrete lytic or blastic lesions.     Imaging Personally Reviewed:  [ x] YES  [ ] NO: No evidence of acute fracture or osteomyelitis       MEDICATIONS  (STANDING):  aspirin enteric coated 81 milliGRAM(s) Oral daily  buDESOnide 160 MICROgram(s)/formoterol 4.5 MICROgram(s) Inhaler 2 Puff(s) Inhalation two times a day  cephalexin 500 milliGRAM(s) Oral four times a day  enoxaparin Injectable 40 milliGRAM(s) SubCutaneous daily  furosemide    Tablet 20 milliGRAM(s) Oral two times a day  pregabalin 100 milliGRAM(s) Oral three times a day    MEDICATIONS  (PRN):  acetaminophen   Tablet .. 650 milliGRAM(s) Oral every 6 hours PRN Mild Pain (1 - 3)  ALBUTerol/ipratropium for Nebulization 3 milliLiter(s) Nebulizer every 6 hours PRN Shortness of Breath and/or Wheezing  traMADol 50 milliGRAM(s) Oral every 6 hours PRN Moderate to Severe Pain

## 2019-07-09 NOTE — PROGRESS NOTE ADULT - ASSESSMENT
73 yo woman with history of HTN, COPD (on intermittent home O2 of 3L NC), unprovoked b/l LE DVT (~20 years ago, coumadin held pending verification of indication), chronic b/l LE edema (on Lasix), and b/l knee osteoarthritis with chronic b/l LE pain presents with 5 days of left foot pain, swelling, and redness, admitted with nonpurulent left foot cellulitis and difficulty with ambulation. BCx +ve for coagulase negative staph. sensitivities pending and new pain on the ventral aspect of the L foot. XR performed yesterday revealed no signs of osteomyelitis, acute fracture or trauma. 73 yo woman with history of HTN, COPD (on intermittent home O2 of 3L NC), ? unprovoked DVT , chronic b/l LE edema (on Lasix), and b/l knee osteoarthritis with chronic b/l LE pain presents with 5 days of left foot pain, swelling, and redness, admitted with nonpurulent left foot cellulitis and difficulty with ambulation. BCx +ve for coagulase negative staph, repeat bcx NGTD. Spoke with PCP office yesterday, they are unsure why patient is on coumadin but patient has a history of afib, this would be indication to anticoagulate her (CHADS-VASC of 4). Ventral sided foot pain improving from yesterday and cellulitis lesion is unchanged. LLE XR performed yesterday r/o fracture or osteomyelitis as cause of new foot pain. 75 yo woman with history of HTN, COPD (on intermittent home O2 of 3L NC), ? unprovoked DVT , chronic b/l LE edema (on Lasix), and b/l knee osteoarthritis with chronic b/l LE pain presents with 5 days of left foot pain, swelling, and redness, admitted with nonpurulent left foot cellulitis and difficulty with ambulation. BCx +ve for coagulase negative staph, repeat bcx NGTD. Spoke with PCP office yesterday, they are unsure why patient is on coumadin but patient has a history of afib, this would be indication to anticoagulate her (CHADS-VASC of 4). Ventral sided foot pain improving from yesterday and cellulitis lesion is unchanged. LLE XR performed yesterday r/o fracture or osteomyelitis as cause of new foot pain. Patient has been stable since admission and will finish antibiotic course tomorrow, her cellulitis is stable but her history of chronic leg pain with decreased pulses on the L side and delayed healing suggest there could be underlying peripheral artery disease that the patient can continue to workup as an outpatient as she still has pulses and her condition has stabilized.

## 2019-07-09 NOTE — PROGRESS NOTE ADULT - PROBLEM SELECTOR PLAN 8
Chronic. On Lyrica and Tramadol. Istop Reference #[id number].  - C/w Lyrica 100 mg tid  - C/w Tramadol 50 mg q6hrs PRN for moderate-severe pain - DVT ppx: On coumadin at home. f/u PCP re coumadin indication   - Diet: DASH/TLC  - Dispo: PT eval, appropriate for inpt STR. d/w case mgmt

## 2019-07-09 NOTE — PROGRESS NOTE ADULT - REASON FOR ADMISSION
L foot pain, swelling, and redness

## 2019-07-09 NOTE — PROGRESS NOTE ADULT - PROBLEM SELECTOR PLAN 3
- repeat bcx pending  - f/u sensitivities for change in current abx regimen  - c/w keflex 500mg qid On intermittent home O2 of 3L NC, usually following exertion. No S/S of exacerbation.   - Duonebs q6hrs PRN  - C/w LABA/ICS with Symbicort (therapeutic interchange for Advair)  - C/w supplemental O2 PRN

## 2019-07-09 NOTE — CHART NOTE - NSCHARTNOTEFT_GEN_A_CORE
Spoke with patient's most recent PCP, Dr. Riggs regarding coumadin and lisinopril prescriptions. She has not been to this office since March 2019. They reported that the patient was inherited from a different PCP already on the coumadin and they did not have any clear information about her DVT history. They noted that the patient has a history of AFib, (this could be the reason why she is on coumadin). They also noted that she was last prescribed lisinopril in January of 2019. She was referred ti cardiology (no recent echo on file) but never followed up with the appointment.        Ivette Finney MD. PGY-1  [phone]

## 2019-07-09 NOTE — PROGRESS NOTE ADULT - PROBLEM SELECTOR PLAN 2
- Patient c/o new ventral foot pain on L side, XR neg for fracture, osteomyelitis   - - Patient c/o new ventral foot pain on L side, XR neg for fracture, osteomyelitis   - cont. to monitor, per pt pain is improving today - Patient c/o new ventral foot pain on L side, XR neg for fracture, osteomyelitis  - pain improving today, per patient    - f/u with PCP for peripheral artery disease work up as outpatient

## 2019-07-12 LAB
BACTERIA BLD CULT: SIGNIFICANT CHANGE UP
BACTERIA BLD CULT: SIGNIFICANT CHANGE UP

## 2019-07-13 LAB
BACTERIA BLD CULT: SIGNIFICANT CHANGE UP
BACTERIA BLD CULT: SIGNIFICANT CHANGE UP

## 2019-08-01 ENCOUNTER — OUTPATIENT (OUTPATIENT)
Dept: OUTPATIENT SERVICES | Facility: HOSPITAL | Age: 74
LOS: 1 days | End: 2019-08-01
Payer: MEDICARE

## 2019-08-01 DIAGNOSIS — Z90.49 ACQUIRED ABSENCE OF OTHER SPECIFIED PARTS OF DIGESTIVE TRACT: Chronic | ICD-10-CM

## 2019-08-09 DIAGNOSIS — Z71.89 OTHER SPECIFIED COUNSELING: ICD-10-CM

## 2019-08-09 PROBLEM — I10 ESSENTIAL (PRIMARY) HYPERTENSION: Chronic | Status: ACTIVE | Noted: 2019-07-05

## 2019-08-09 PROBLEM — M17.10 UNILATERAL PRIMARY OSTEOARTHRITIS, UNSPECIFIED KNEE: Chronic | Status: ACTIVE | Noted: 2019-07-06

## 2019-08-09 PROBLEM — R60.0 LOCALIZED EDEMA: Chronic | Status: ACTIVE | Noted: 2019-07-06

## 2019-08-09 PROBLEM — I82.409 ACUTE EMBOLISM AND THROMBOSIS OF UNSPECIFIED DEEP VEINS OF UNSPECIFIED LOWER EXTREMITY: Chronic | Status: ACTIVE | Noted: 2019-07-06

## 2019-08-09 PROBLEM — J44.9 CHRONIC OBSTRUCTIVE PULMONARY DISEASE, UNSPECIFIED: Chronic | Status: ACTIVE | Noted: 2019-07-06

## 2019-08-27 DIAGNOSIS — Z76.89 PERSONS ENCOUNTERING HEALTH SERVICES IN OTHER SPECIFIED CIRCUMSTANCES: ICD-10-CM

## 2020-10-06 NOTE — PROGRESS NOTE ADULT - ASSESSMENT
Haris was given XR results on 10/2/2020 (see phone encounter). LVM letting him know that if he just needed to reference results they can be viewed on his patient portal. If he had specific questions about his results or next steps he was advised to call [phone].    73 yo woman with history of HTN, COPD (on intermittent home O2 of 3L NC), unprovoked b/l LE DVT (~20 years ago, on coumadin), chronic b/l LE edema (on Lasix), and b/l knee osteoarthritis with chronic b/l LE pain presents with 5 days of left foot pain, swelling, and redness, admitted with nonpurulent left foot cellulitis and difficulty with ambulation. Cellulitis is improv

## 2021-06-01 PROCEDURE — G9005: CPT

## 2022-03-15 NOTE — PHYSICAL THERAPY INITIAL EVALUATION ADULT - LONG TERM MEMORY, REHAB EVAL
Patient had 0.9NS 500ml bag infusing from EMS arrival     [facility], [facility] / [facility]  03/14/22 2010 intact
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
## 2025-03-07 NOTE — PHYSICAL THERAPY INITIAL EVALUATION ADULT - WORK/LEISURE ACTIVITY, REHAB EVAL
"Consult received for Vascular access care.  See LDA for details. For additional needs place \"Nursing to Consult for Vascular Access\" [id number] order in EPIC.  "
independent